# Patient Record
Sex: FEMALE | Race: WHITE | NOT HISPANIC OR LATINO | Employment: FULL TIME
[De-identification: names, ages, dates, MRNs, and addresses within clinical notes are randomized per-mention and may not be internally consistent; named-entity substitution may affect disease eponyms.]

---

## 2017-06-03 ENCOUNTER — HEALTH MAINTENANCE LETTER (OUTPATIENT)
Age: 52
End: 2017-06-03

## 2022-04-25 ENCOUNTER — OFFICE VISIT (OUTPATIENT)
Dept: FAMILY MEDICINE | Facility: CLINIC | Age: 57
End: 2022-04-25
Payer: COMMERCIAL

## 2022-04-25 VITALS
SYSTOLIC BLOOD PRESSURE: 118 MMHG | WEIGHT: 191 LBS | OXYGEN SATURATION: 100 % | RESPIRATION RATE: 16 BRPM | BODY MASS INDEX: 29.98 KG/M2 | HEART RATE: 83 BPM | HEIGHT: 67 IN | DIASTOLIC BLOOD PRESSURE: 78 MMHG | TEMPERATURE: 98 F

## 2022-04-25 DIAGNOSIS — R11.2 NAUSEA AND VOMITING, INTRACTABILITY OF VOMITING NOT SPECIFIED, UNSPECIFIED VOMITING TYPE: ICD-10-CM

## 2022-04-25 DIAGNOSIS — Z12.11 SCREEN FOR COLON CANCER: ICD-10-CM

## 2022-04-25 DIAGNOSIS — N20.2 CALCULUS OF KIDNEY WITH CALCULUS OF URETER: ICD-10-CM

## 2022-04-25 DIAGNOSIS — Z13.220 SCREENING FOR HYPERLIPIDEMIA: ICD-10-CM

## 2022-04-25 DIAGNOSIS — R10.13 ABDOMINAL PAIN, EPIGASTRIC: Primary | ICD-10-CM

## 2022-04-25 DIAGNOSIS — N13.1 HYDRONEPHROSIS WITH URETERAL STRICTURE, NOT ELSEWHERE CLASSIFIED: ICD-10-CM

## 2022-04-25 DIAGNOSIS — N20.1 URETERAL STONE: ICD-10-CM

## 2022-04-25 LAB
ERYTHROCYTE [DISTWIDTH] IN BLOOD BY AUTOMATED COUNT: 13.6 % (ref 10–15)
HCT VFR BLD AUTO: 45.5 % (ref 35–47)
HGB BLD-MCNC: 15 G/DL (ref 11.7–15.7)
MCH RBC QN AUTO: 29.5 PG (ref 26.5–33)
MCHC RBC AUTO-ENTMCNC: 33 G/DL (ref 31.5–36.5)
MCV RBC AUTO: 89 FL (ref 78–100)
PLATELET # BLD AUTO: 216 10E3/UL (ref 150–450)
RBC # BLD AUTO: 5.09 10E6/UL (ref 3.8–5.2)
WBC # BLD AUTO: 7.4 10E3/UL (ref 4–11)

## 2022-04-25 PROCEDURE — 83690 ASSAY OF LIPASE: CPT | Performed by: FAMILY MEDICINE

## 2022-04-25 PROCEDURE — 80053 COMPREHEN METABOLIC PANEL: CPT | Performed by: FAMILY MEDICINE

## 2022-04-25 PROCEDURE — 36415 COLL VENOUS BLD VENIPUNCTURE: CPT | Performed by: FAMILY MEDICINE

## 2022-04-25 PROCEDURE — 85027 COMPLETE CBC AUTOMATED: CPT | Performed by: FAMILY MEDICINE

## 2022-04-25 PROCEDURE — 99204 OFFICE O/P NEW MOD 45 MIN: CPT | Performed by: FAMILY MEDICINE

## 2022-04-25 RX ORDER — ONDANSETRON 4 MG/1
4 TABLET, FILM COATED ORAL EVERY 8 HOURS PRN
Qty: 15 TABLET | Refills: 0 | Status: SHIPPED | OUTPATIENT
Start: 2022-04-25 | End: 2022-09-23

## 2022-04-25 RX ORDER — MULTIVITAMIN
1 TABLET ORAL DAILY
COMMUNITY
End: 2022-04-25

## 2022-04-25 ASSESSMENT — PAIN SCALES - GENERAL: PAINLEVEL: MILD PAIN (2)

## 2022-04-25 NOTE — PROGRESS NOTES
"  Assessment & Plan     Screen for colon cancer      Screening for hyperlipidemia      Abdominal pain, epigastric  Has been for last 3-4 days after taking undercooked egg white, has no fever  Started with diarrhea, then started having N/V, not able to keep the food down. Will have her to try zofran   Will review the lab and abd XR for further evaluation   - CBC with platelets; Future  - Comprehensive metabolic panel (BMP + Alb, Alk Phos, ALT, AST, Total. Bili, TP); Future  - Lipase; Future  - XR Abdomen 2 Views; Future  - Enteric Bacteria and Virus Panel by WILDER Stool; Future  - Ova and Parasite Exam Routine; Future  - Cryptosporidium/Giardia Immunoassay; Future  - Clostridium difficile Toxin B PCR; Future  - CBC with platelets  - Comprehensive metabolic panel (BMP + Alb, Alk Phos, ALT, AST, Total. Bili, TP)  - Lipase  - Enteric Bacteria and Virus Panel by WILDER Stool  - Ova and Parasite Exam Routine  - Cryptosporidium/Giardia Immunoassay  - Clostridium difficile Toxin B PCR         BMI:   Estimated body mass index is 29.91 kg/m  as calculated from the following:    Height as of this encounter: 1.702 m (5' 7\").    Weight as of this encounter: 86.6 kg (191 lb).   Weight management plan: Discussed healthy diet and exercise guidelines    FUTURE APPOINTMENTS:       - Follow-up visit in 1 week if not improving     No follow-ups on file.    Christopher Hernandez MD  [facility] PRAIRIELIEL Cordero is a 57 year old who presents for the following health issues     History of Present Illness       Reason for visit:  Left abdominal pain, vomiting  Symptom onset:  3-7 days ago  Symptoms include:  Severe pain in left abdominal area, causing throwup. When aubsided it feels like a constant cramp  Symptom progression:  Staying the same  Had these symptoms before:  No  What makes it worse:  No  What makes it better:  Vomiting    She eats 2-3 servings of fruits and vegetables daily.She consumes 0 sweetened " "beverage(s) daily.She exercises with enough effort to increase her heart rate 9 or less minutes per day.  She exercises with enough effort to increase her heart rate 3 or less days per week.   She is taking medications regularly.       Review of Systems   Constitutional, HEENT, cardiovascular, pulmonary, gi and gu systems are negative, except as otherwise noted.      Objective    /78   Pulse 83   Temp 98  F (36.7  C) (Temporal)   Resp 16   Ht 1.702 m (5' 7\")   Wt 86.6 kg (191 lb)   SpO2 100%   BMI 29.91 kg/m    Body mass index is 29.91 kg/m .  Physical Exam   GENERAL: healthy, alert and no distress  NECK: no adenopathy, no asymmetry, masses, or scars and thyroid normal to palpation  RESP: lungs clear to auscultation - no rales, rhonchi or wheezes  CV: regular rate and rhythm, normal S1 S2, no S3 or S4, no murmur, click or rub, no peripheral edema and peripheral pulses strong  ABDOMEN: soft, nontender, no hepatosplenomegaly, no masses and bowel sounds normal  MS: no gross musculoskeletal defects noted, no edema                "

## 2022-04-26 ENCOUNTER — HOSPITAL ENCOUNTER (OUTPATIENT)
Dept: CT IMAGING | Facility: CLINIC | Age: 57
Discharge: HOME | End: 2022-04-26
Attending: FAMILY MEDICINE | Admitting: FAMILY MEDICINE
Payer: COMMERCIAL

## 2022-04-26 DIAGNOSIS — N20.2 CALCULUS OF KIDNEY WITH CALCULUS OF URETER: ICD-10-CM

## 2022-04-26 LAB
ALBUMIN SERPL-MCNC: 4 G/DL (ref 3.4–5)
ALP SERPL-CCNC: 120 U/L (ref 40–150)
ALT SERPL W P-5'-P-CCNC: 28 U/L (ref 0–50)
ANION GAP SERPL CALCULATED.3IONS-SCNC: 6 MMOL/L (ref 3–14)
AST SERPL W P-5'-P-CCNC: 20 U/L (ref 0–45)
BILIRUB SERPL-MCNC: 0.6 MG/DL (ref 0.2–1.3)
BUN SERPL-MCNC: 15 MG/DL (ref 7–30)
CALCIUM SERPL-MCNC: 9.8 MG/DL (ref 8.5–10.1)
CHLORIDE BLD-SCNC: 107 MMOL/L (ref 94–109)
CO2 SERPL-SCNC: 27 MMOL/L (ref 20–32)
CREAT SERPL-MCNC: 1.16 MG/DL (ref 0.52–1.04)
GFR SERPL CREATININE-BSD FRML MDRD: 54 ML/MIN/1.73M2
GLUCOSE BLD-MCNC: 95 MG/DL (ref 70–99)
LIPASE SERPL-CCNC: 122 U/L (ref 73–393)
POTASSIUM BLD-SCNC: 4.1 MMOL/L (ref 3.4–5.3)
PROT SERPL-MCNC: 7.9 G/DL (ref 6.8–8.8)
SODIUM SERPL-SCNC: 140 MMOL/L (ref 133–144)

## 2022-04-26 PROCEDURE — 74176 CT ABD & PELVIS W/O CONTRAST: CPT

## 2022-04-27 ENCOUNTER — PRE VISIT (OUTPATIENT)
Dept: UROLOGY | Facility: CLINIC | Age: 57
End: 2022-04-27
Payer: COMMERCIAL

## 2022-04-27 RX ORDER — TAMSULOSIN HYDROCHLORIDE 0.4 MG/1
0.4 CAPSULE ORAL DAILY
Qty: 20 CAPSULE | Refills: 0 | Status: SHIPPED | OUTPATIENT
Start: 2022-04-27 | End: 2022-09-23

## 2022-04-27 NOTE — TELEPHONE ENCOUNTER
MEDICAL RECORDS REQUEST   [facility]  Urology Clinic  [address] [place] [address]  PHONE: [phone]  Fax: [phone]        FUTURE VISIT INFORMATION                                                   Consuelo Trejo, : 1965 scheduled for future visit at [facility]    APPOINTMENT INFORMATION:    Date: 2022    Provider:  Meliton Camarillo MD    Reason for Visit/Diagnosis: kidney stones    REFERRAL INFORMATION:    Referring provider:  Christopher Hernandez MD    Referring providers clinic:  [facility]    RECORDS REQUESTED FOR VISIT                                                     NOTES  STATUS/DETAILS   OFFICE NOTE from referring provider  yes, 2022 -- Christopher Hernandez MD in [facility]   MEDICATION LIST  yes   KIDNEY STONE     CT ABD PELVIS  yes, 2022   IMAGING (IMAGES & REPORT)  yes, 2022 -- XR ABD     PRE-VISIT CHECKLIST      Record collection complete Yes   Appointment appropriately scheduled           (right time/right provider) Yes   Joint diagnostic appointment coordinated correctly          (ensure right order & amount of time) Yes   MyChart activation Yes   Questionnaire complete If no, please explain pending

## 2022-04-29 ENCOUNTER — VIRTUAL VISIT (OUTPATIENT)
Dept: UROLOGY | Facility: CLINIC | Age: 57
End: 2022-04-29
Attending: FAMILY MEDICINE
Payer: COMMERCIAL

## 2022-04-29 DIAGNOSIS — N13.1 HYDRONEPHROSIS WITH URETERAL STRICTURE, NOT ELSEWHERE CLASSIFIED: ICD-10-CM

## 2022-04-29 DIAGNOSIS — N20.2 CALCULUS OF KIDNEY WITH CALCULUS OF URETER: ICD-10-CM

## 2022-04-29 DIAGNOSIS — N20.1 URETERAL STONE: ICD-10-CM

## 2022-04-29 PROCEDURE — 99204 OFFICE O/P NEW MOD 45 MIN: CPT | Mod: GT | Performed by: UROLOGY

## 2022-04-29 NOTE — PROGRESS NOTES
Consuelo is a 57 year old who is being evaluated via a billable video visit.      How would you like to obtain your AVS? Mail a copy  If the video visit is dropped, the invitation should be resent by: Text to cell phone: [phone]  Will anyone else be joining your video visit? Yes: melissa (wife). How would they like to receive their invitation? Text to cell phone: [phone]      Video Start Time: 3:12 PM  Video-Visit Details    Type of service:  Video Visit    Video End Time:3:37 PM    Originating Location (pt. Location): Home    Distant Location (provider location):  [facility] UROLOGY [facility]     Platform used for Video Visit: [employer]     Name: Consuelo Trejo   MRN: [id number]  YOB: 1965    Assessment and Plan:  57 year old female with left ureteral and multiple left renal stones.     1. Calculus of kidney with calculus of ureter  2. Ureteral stone  3. Hydronephrosis with ureteral stricture, not elsewhere classified    We discussed observation, shock wave lithotripsy, ureteroscopy and percutaneous nephrolithotomy as the main surgical approaches to upper urinary tract stones.  We reviewed risks and benefits including but not limited to the following: bleeding, infection, damage to adjacent organs, ureteral stricture, need for staged treatment, incomplete stone removal.    Ultimately the patient has elected to proceed with left ureteroscopy because of ability to manage ureteral and renal stone in one setting with removal    Plan:  -90 min left URS at Leslie Camarillo MD  April 29, 2022         Chief Complaint: left ureteral and renal sotnes    History of Present Illness:  Consuelo Trejo is a 57 year old female seen in consultation from Dr Hernandez for discussion of nephrolithiasis.  Accompanied by her wife.     The current episode was first found due to abdominal pain started last week.  CT abd/pel without contrastperformed on 4/26/2022 (images personally reviewed)  demonstrated:  Right Kidney: no right stones  Left Kidney: 6 mm left proximal ureteral stone, 7 mm renal stone, two <3 mm renal stones  Additional relevant findings: left adrenal adenoma    Currently, they are experiencing severe pain from Thursday through Monday with nausea.   minimal flank pain, minimal nausea, no vomiting, no hematuria, or no dysuria.  They have not experienced recent stone passage.     Pertinent stone history:  -- Personal stone history  -- Prior stone procedure  -- Prior stone analysis  -- Prior metabolic evaluation  + Family stone history (dad and brother)    Pertinent stone medical history  -- Diabetes  -- Neurologic disease limiting mobility   -- Osteoporosis  -- Gout  -- Gastric bypass surgery  -- Sarcoidosis  -- Inflammatory bowel disease  -- History of non-stone  surgery     Pertinent medications:  -- Antiplatelet  -- Anticoagulant  -- Topiramate   -- Thiazaide  -- Potassium supplement      I reviewed internal labs, of which pertinent ones include:   Hemoglobin   Date Value Ref Range Status   2022 15.0 11.7 - 15.7 g/dL Final   05/10/2012 13.2 12.0 - 18.0 g/dL      Potassium   Date Value Ref Range Status   2022 4.1 3.4 - 5.3 mmol/L Final   2012 4.3 3.5 - 5.3 mmol/L Final     Creatinine   Date Value Ref Range Status   2022 1.16 (H) 0.52 - 1.04 mg/dL Final   2012 0.90 0.50 - 1.10 mg/dL Final     pH Urine   Date Value Ref Range Status   2012 5.0 5.0 - 7.0 pH        I reviewed internal records which in summarized above.         Past Medical History:  No past medical history on file.         Past Surgical History:  Past Surgical History:   Procedure Laterality Date     CARPAL TUNNEL RELEASE RT/LT      right      SECTION       COLONOSCOPY  22    Normal     uterine ablation              Social History:  Social History     Tobacco Use     Smoking status: Former Smoker     Packs/day: 1.00     Years: 20.00     Pack years: 20.00     Types: Cigarettes  "    Quit date: 8/3/2002     Years since quittin.7     Smokeless tobacco: Never Used   Substance Use Topics     Alcohol use: No     Drug use: Not Currently     Types: Marijuana     Comment: Tried in HS            Family History:  Family History   Problem Relation Age of Onset     Cardiovascular Mother         mi 79, dm,  at 79     Diabetes Mother      Family History Negative Father         a and well     Anesthesia Reaction Father      Asthma Father      Anesthesia Reaction Sister             Allergies:  Allergies   Allergen Reactions     Advil [Ibuprofen] Other (See Comments)     \"Lump in throat\"            Medications:  Current Outpatient Medications   Medication Sig     ondansetron (ZOFRAN) 4 MG tablet Take 1 tablet (4 mg) by mouth every 8 hours as needed for nausea     tamsulosin (FLOMAX) 0.4 MG capsule Take 1 capsule (0.4 mg) by mouth daily     No current facility-administered medications for this visit.       Review of Systems:   ROS: 10 point ROS neg other than the symptoms noted above in the HPI.    Physical Exam:  B/P: Data Unavailable, T: Data Unavailable, P: Data Unavailable, R: Data Unavailable  Estimated body mass index is 29.91 kg/m  as calculated from the following:    Height as of 22: 1.702 m (5' 7\").    Weight as of 22: 86.6 kg (191 lb).  General Appearance Adult: Alert, no acute distress, oriented  Lungs: no respiratory distress, or pursed lip breathing  Neuro: Alert, oriented, speech and mentation normal  Psych: affect and mood normal    Outside records:   I spent 0 minutes reviewing outside records.    "

## 2022-04-29 NOTE — LETTER
4/29/2022       RE: Consuelo Trejo  [address] [address]     Dear Colleague,    Thank you for referring your patient, Consuelo Trejo, to the [facility] [place] at [facility]. Please see a copy of my visit note below.    Consuelo is a 57 year old who is being evaluated via a billable video visit.      How would you like to obtain your AVS? Mail a copy  If the video visit is dropped, the invitation should be resent by: Text to cell phone: [phone]  Will anyone else be joining your video visit? Yes: melissa (wife). How would they like to receive their invitation? Text to cell phone: [phone]      Video Start Time: 3:12 PM  Video-Visit Details    Type of service:  Video Visit    Video End Time:3:37 PM    Originating Location (pt. Location): Home    Distant Location (provider location):  [facility] UROLOG[facility]     Platform used for Video Visit: [employer]     Name: Consuelo Trejo   MRN: [id number]  YOB: 1965    Assessment and Plan:  57 year old female with left ureteral and multiple left renal stones.     1. Calculus of kidney with calculus of ureter  2. Ureteral stone  3. Hydronephrosis with ureteral stricture, not elsewhere classified    We discussed observation, shock wave lithotripsy, ureteroscopy and percutaneous nephrolithotomy as the main surgical approaches to upper urinary tract stones.  We reviewed risks and benefits including but not limited to the following: bleeding, infection, damage to adjacent organs, ureteral stricture, need for staged treatment, incomplete stone removal.    Ultimately the patient has elected to proceed with left ureteroscopy because of ability to manage ureteral and renal stone in one setting with removal    Plan:  -90 min left URS at Leslie Camarillo MD  April 29, 2022         Chief Complaint: left ureteral and renal  clarence    History of Present Illness:  Consuelo Trejo is a 57 year old female seen in consultation from Dr Hernandez for discussion of nephrolithiasis.  Accompanied by her wife.     The current episode was first found due to abdominal pain started last week.  CT abd/pel without contrastperformed on 4/26/2022 (images personally reviewed) demonstrated:  Right Kidney: no right stones  Left Kidney: 6 mm left proximal ureteral stone, 7 mm renal stone, two <3 mm renal stones  Additional relevant findings: left adrenal adenoma    Currently, they are experiencing severe pain from Thursday through Monday with nausea.   minimal flank pain, minimal nausea, no vomiting, no hematuria, or no dysuria.  They have not experienced recent stone passage.     Pertinent stone history:  -- Personal stone history  -- Prior stone procedure  -- Prior stone analysis  -- Prior metabolic evaluation  + Family stone history (dad and brother)    Pertinent stone medical history  -- Diabetes  -- Neurologic disease limiting mobility   -- Osteoporosis  -- Gout  -- Gastric bypass surgery  -- Sarcoidosis  -- Inflammatory bowel disease  -- History of non-stone  surgery     Pertinent medications:  -- Antiplatelet  -- Anticoagulant  -- Topiramate   -- Thiazaide  -- Potassium supplement      I reviewed internal labs, of which pertinent ones include:   Hemoglobin   Date Value Ref Range Status   04/25/2022 15.0 11.7 - 15.7 g/dL Final   05/10/2012 13.2 12.0 - 18.0 g/dL      Potassium   Date Value Ref Range Status   04/25/2022 4.1 3.4 - 5.3 mmol/L Final   04/18/2012 4.3 3.5 - 5.3 mmol/L Final     Creatinine   Date Value Ref Range Status   04/25/2022 1.16 (H) 0.52 - 1.04 mg/dL Final   04/18/2012 0.90 0.50 - 1.10 mg/dL Final     pH Urine   Date Value Ref Range Status   04/18/2012 5.0 5.0 - 7.0 pH        I reviewed internal records which in summarized above.         Past Medical History:  No past medical history on file.         Past Surgical History:  Past  "Surgical History:   Procedure Laterality Date     CARPAL TUNNEL RELEASE RT/LT      right      SECTION       COLONOSCOPY  22    Normal     uterine ablation              Social History:  Social History     Tobacco Use     Smoking status: Former Smoker     Packs/day: 1.00     Years: 20.00     Pack years: 20.00     Types: Cigarettes     Quit date: 8/3/2002     Years since quittin.7     Smokeless tobacco: Never Used   Substance Use Topics     Alcohol use: No     Drug use: Not Currently     Types: Marijuana     Comment: Tried in HS            Family History:  Family History   Problem Relation Age of Onset     Cardiovascular Mother         mi 79, dm,  at 79     Diabetes Mother      Family History Negative Father         a and well     Anesthesia Reaction Father      Asthma Father      Anesthesia Reaction Sister             Allergies:  Allergies   Allergen Reactions     Advil [Ibuprofen] Other (See Comments)     \"Lump in throat\"            Medications:  Current Outpatient Medications   Medication Sig     ondansetron (ZOFRAN) 4 MG tablet Take 1 tablet (4 mg) by mouth every 8 hours as needed for nausea     tamsulosin (FLOMAX) 0.4 MG capsule Take 1 capsule (0.4 mg) by mouth daily     No current facility-administered medications for this visit.       Review of Systems:   ROS: 10 point ROS neg other than the symptoms noted above in the HPI.    Physical Exam:  B/P: Data Unavailable, T: Data Unavailable, P: Data Unavailable, R: Data Unavailable  Estimated body mass index is 29.91 kg/m  as calculated from the following:    Height as of 22: 1.702 m (5' 7\").    Weight as of 22: 86.6 kg (191 lb).  General Appearance Adult: Alert, no acute distress, oriented  Lungs: no respiratory distress, or pursed lip breathing  Neuro: Alert, oriented, speech and mentation normal  Psych: affect and mood normal    Outside records:   I spent 0 minutes reviewing outside records.      "

## 2022-05-02 DIAGNOSIS — Z11.59 ENCOUNTER FOR SCREENING FOR OTHER VIRAL DISEASES: Primary | ICD-10-CM

## 2022-05-04 ENCOUNTER — LAB (OUTPATIENT)
Dept: LAB | Facility: CLINIC | Age: 57
End: 2022-05-04
Payer: COMMERCIAL

## 2022-05-04 ENCOUNTER — TELEPHONE (OUTPATIENT)
Dept: FAMILY MEDICINE | Facility: CLINIC | Age: 57
End: 2022-05-04
Payer: COMMERCIAL

## 2022-05-04 DIAGNOSIS — N20.1 URETERAL STONE: Primary | ICD-10-CM

## 2022-05-04 PROCEDURE — 87088 URINE BACTERIA CULTURE: CPT

## 2022-05-04 PROCEDURE — 87086 URINE CULTURE/COLONY COUNT: CPT

## 2022-05-04 NOTE — H&P (VIEW-ONLY)
[address] [address]  Phone: [phone]  Primary Provider: No Ref-Primary, Physician        PREOPERATIVE EVALUATION:  Today's date: 5/5/2022    Consuelo Trejo is a 57 year old female who presents for a preoperative evaluation.    Surgical Information:  Surgery/Procedure: Cystoscopy, left retrograde pyelogram, left ureteroscopy with laser lithotripsy and stone basket extraction, left stent placement  Surgery Location: [facility]  Surgeon: Jennifer  Surgery Date: 05/17  Time of Surgery: 140  Where patient plans to recover: At home with family  Fax number for surgical facility: Note does not need to be faxed, will be available electronically in Epic.    Type of Anesthesia Anticipated: General    Assessment & Plan     The proposed surgical procedure is considered LOW risk.    Preop general physical exam    - EKG 12-lead complete w/read - Clinics  - Basic metabolic panel  (Ca, Cl, CO2, Creat, Gluc, K, Na, BUN); Future    Kidney stone    - Basic metabolic panel  (Ca, Cl, CO2, Creat, Gluc, K, Na, BUN); Future    Risks and Recommendations:  The patient has the following additional risks and recommendations for perioperative complications:   - No identified additional risk factors other than previously addressed    Medication Instructions:   - diclofenac (Voltaren): HOLD 1 day before surgery.     RECOMMENDATION:  APPROVAL GIVEN to proceed with proposed procedure, without further diagnostic evaluation.    Review of external notes as documented above           Subjective       Preop Questions 5/3/2022   1. Have you ever had a heart attack or stroke? No   2. Have you ever had surgery on your heart or blood vessels, such as a stent placement, a coronary artery bypass, or surgery on an artery in your head, neck, heart, or legs? No   3. Do you have chest pain with activity? No   4. Do you have a history of  heart failure? No   5.  Do you currently have a cold, bronchitis or symptoms of other infection? No   6. Do you have a cough, shortness of breath, or wheezing? No   7. Do you or anyone in your family have previous history of blood clots? No   8. Do you or does anyone in your family have a serious bleeding problem such as prolonged bleeding following surgeries or cuts? No   9. Have you ever had problems with anemia or been told to take iron pills? No   10. Have you had any abnormal blood loss such as black, tarry or bloody stools, or abnormal vaginal bleeding? No   11. Have you ever had a blood transfusion? No   12. Are you willing to have a blood transfusion if it is medically needed before, during, or after your surgery? Yes   13. Have you or any of your relatives ever had problems with anesthesia? YES   14. Do you have sleep apnea, excessive snoring or daytime drowsiness? YES   14a. Do you have a CPAP machine? No   15. Do you have any artifical heart valves or other implanted medical devices like a pacemaker, defibrillator, or continuous glucose monitor? No   16. Do you have artificial joints? No   17. Are you allergic to latex? No   18. Is there any chance that you may be pregnant? No       Health Care Directive:  Patient does not have a Health Care Directive or Living Will: Discussed advance care planning with patient; however, patient declined at this time.    Preoperative Review of :   reviewed - no record of controlled substances prescribed.    Review of Systems  CONSTITUTIONAL: NEGATIVE for fever, chills, change in weight  ENT/MOUTH: NEGATIVE for ear, mouth and throat problems  RESP: NEGATIVE for significant cough or SOB  CV: NEGATIVE for chest pain, palpitations or peripheral edema    Patient Active Problem List    Diagnosis Date Noted     Urticaria 05/09/2012     Priority: Medium     Weight gain 04/17/2012     Priority: Medium     Ankle pain 04/17/2012     Priority: Medium     Ankle pain 04/17/2012     Priority: Medium     "  No past medical history on file.  Past Surgical History:   Procedure Laterality Date     CARPAL TUNNEL RELEASE RT/LT      right      SECTION       COLONOSCOPY  22    Normal     uterine ablation       Current Outpatient Medications   Medication Sig Dispense Refill     diclofenac (VOLTAREN) 50 MG EC tablet Take 1 tablet (50 mg) by mouth 3 times daily as needed for moderate pain 42 tablet 0     ondansetron (ZOFRAN) 4 MG tablet Take 1 tablet (4 mg) by mouth every 8 hours as needed for nausea 15 tablet 0     tamsulosin (FLOMAX) 0.4 MG capsule Take 1 capsule (0.4 mg) by mouth daily 20 capsule 0       Allergies   Allergen Reactions     Advil [Ibuprofen] Other (See Comments)     \"Lump in throat\"        Social History     Tobacco Use     Smoking status: Former Smoker     Packs/day: 1.00     Years: 20.00     Pack years: 20.00     Types: Cigarettes     Quit date: 8/3/2002     Years since quittin.7     Smokeless tobacco: Never Used   Substance Use Topics     Alcohol use: No     Family History   Problem Relation Age of Onset     Cardiovascular Mother         mi 79, dm,  at 79     Diabetes Mother      Family History Negative Father         a and well     Anesthesia Reaction Father      Asthma Father      Anesthesia Reaction Sister      History   Drug Use Unknown     Comment: Tried in HS         Objective     /72   Pulse 82   Temp 98.7  F (37.1  C) (Tympanic)   Wt 86.6 kg (191 lb)   BMI 29.91 kg/m      Physical Exam  GENERAL APPEARANCE: healthy, alert and no distress  HENT: ear canals and TM's normal and nose and mouth without ulcers or lesions  RESP: lungs clear to auscultation - no rales, rhonchi or wheezes  CV: regular rate and rhythm, normal S1 S2, no S3 or S4 and no murmur, click or rub   ABDOMEN: soft, nontender, no HSM or masses and bowel sounds normal  NEURO: Normal strength and tone, sensory exam grossly normal, mentation intact and speech normal    Recent Labs   Lab Test 22  2655 "   HGB 15.0         POTASSIUM 4.1   CR 1.16*        Diagnostics:  Recent Results (from the past 168 hour(s))   Urine Culture    Collection Time: 05/04/22  9:34 AM    Specimen: Urine, Midstream   Result Value Ref Range    Culture >100,000 CFU/mL Gardnerella vaginalis (A)    Basic metabolic panel  (Ca, Cl, CO2, Creat, Gluc, K, Na, BUN)    Collection Time: 05/05/22  2:57 PM   Result Value Ref Range    Sodium 138 133 - 144 mmol/L    Potassium 4.3 3.4 - 5.3 mmol/L    Chloride 105 94 - 109 mmol/L    Carbon Dioxide (CO2) 27 20 - 32 mmol/L    Anion Gap 6 3 - 14 mmol/L    Urea Nitrogen 14 7 - 30 mg/dL    Creatinine 0.63 0.52 - 1.04 mg/dL    Calcium 9.2 8.5 - 10.1 mg/dL    Glucose 96 70 - 99 mg/dL    GFR Estimate >90 >60 mL/min/1.73m2      EKG: appears normal, NSR, normal axis, normal intervals, no acute ST/T changes c/w ischemia, no LVH by voltage criteria    Revised Cardiac Risk Index (RCRI):  The patient has the following serious cardiovascular risks for perioperative complications:   - No serious cardiac risks = 0 points     RCRI Interpretation: 0 points: Class I (very low risk - 0.4% complication rate)           Signed Electronically by: Christopher Hernandez MD  Copy of this evaluation report is provided to requesting physician.

## 2022-05-04 NOTE — TELEPHONE ENCOUNTER
Received a call from the patient stating her Abdominal CT completed 4/26/22 is not being covered under insurance. Insurance states they need a copy of the patient's results.     Referred patient to billing department. Billing department going to send patient's results to insurance company.    Carmel Strong RN

## 2022-05-04 NOTE — PROGRESS NOTES
[address] [address]  Phone: [phone]  Primary Provider: No Ref-Primary, Physician        PREOPERATIVE EVALUATION:  Today's date: 5/5/2022    Consuelo Trejo is a 57 year old female who presents for a preoperative evaluation.    Surgical Information:  Surgery/Procedure: Cystoscopy, left retrograde pyelogram, left ureteroscopy with laser lithotripsy and stone basket extraction, left stent placement  Surgery Location: [facility]  Surgeon: Jennifer  Surgery Date: 05/17  Time of Surgery: 140  Where patient plans to recover: At home with family  Fax number for surgical facility: Note does not need to be faxed, will be available electronically in Epic.    Type of Anesthesia Anticipated: General    Assessment & Plan     The proposed surgical procedure is considered LOW risk.    Preop general physical exam    - EKG 12-lead complete w/read - Clinics  - Basic metabolic panel  (Ca, Cl, CO2, Creat, Gluc, K, Na, BUN); Future    Kidney stone    - Basic metabolic panel  (Ca, Cl, CO2, Creat, Gluc, K, Na, BUN); Future    Risks and Recommendations:  The patient has the following additional risks and recommendations for perioperative complications:   - No identified additional risk factors other than previously addressed    Medication Instructions:   - diclofenac (Voltaren): HOLD 1 day before surgery.     RECOMMENDATION:  APPROVAL GIVEN to proceed with proposed procedure, without further diagnostic evaluation.    Review of external notes as documented above           Subjective       Preop Questions 5/3/2022   1. Have you ever had a heart attack or stroke? No   2. Have you ever had surgery on your heart or blood vessels, such as a stent placement, a coronary artery bypass, or surgery on an artery in your head, neck, heart, or legs? No   3. Do you have chest pain with activity? No   4. Do you have a history of  heart failure? No   5.  Do you currently have a cold, bronchitis or symptoms of other infection? No   6. Do you have a cough, shortness of breath, or wheezing? No   7. Do you or anyone in your family have previous history of blood clots? No   8. Do you or does anyone in your family have a serious bleeding problem such as prolonged bleeding following surgeries or cuts? No   9. Have you ever had problems with anemia or been told to take iron pills? No   10. Have you had any abnormal blood loss such as black, tarry or bloody stools, or abnormal vaginal bleeding? No   11. Have you ever had a blood transfusion? No   12. Are you willing to have a blood transfusion if it is medically needed before, during, or after your surgery? Yes   13. Have you or any of your relatives ever had problems with anesthesia? YES   14. Do you have sleep apnea, excessive snoring or daytime drowsiness? YES   14a. Do you have a CPAP machine? No   15. Do you have any artifical heart valves or other implanted medical devices like a pacemaker, defibrillator, or continuous glucose monitor? No   16. Do you have artificial joints? No   17. Are you allergic to latex? No   18. Is there any chance that you may be pregnant? No       Health Care Directive:  Patient does not have a Health Care Directive or Living Will: Discussed advance care planning with patient; however, patient declined at this time.    Preoperative Review of :   reviewed - no record of controlled substances prescribed.    Review of Systems  CONSTITUTIONAL: NEGATIVE for fever, chills, change in weight  ENT/MOUTH: NEGATIVE for ear, mouth and throat problems  RESP: NEGATIVE for significant cough or SOB  CV: NEGATIVE for chest pain, palpitations or peripheral edema    Patient Active Problem List    Diagnosis Date Noted     Urticaria 05/09/2012     Priority: Medium     Weight gain 04/17/2012     Priority: Medium     Ankle pain 04/17/2012     Priority: Medium     Ankle pain 04/17/2012     Priority: Medium     "  No past medical history on file.  Past Surgical History:   Procedure Laterality Date     CARPAL TUNNEL RELEASE RT/LT      right      SECTION       COLONOSCOPY  22    Normal     uterine ablation       Current Outpatient Medications   Medication Sig Dispense Refill     diclofenac (VOLTAREN) 50 MG EC tablet Take 1 tablet (50 mg) by mouth 3 times daily as needed for moderate pain 42 tablet 0     ondansetron (ZOFRAN) 4 MG tablet Take 1 tablet (4 mg) by mouth every 8 hours as needed for nausea 15 tablet 0     tamsulosin (FLOMAX) 0.4 MG capsule Take 1 capsule (0.4 mg) by mouth daily 20 capsule 0       Allergies   Allergen Reactions     Advil [Ibuprofen] Other (See Comments)     \"Lump in throat\"        Social History     Tobacco Use     Smoking status: Former Smoker     Packs/day: 1.00     Years: 20.00     Pack years: 20.00     Types: Cigarettes     Quit date: 8/3/2002     Years since quittin.7     Smokeless tobacco: Never Used   Substance Use Topics     Alcohol use: No     Family History   Problem Relation Age of Onset     Cardiovascular Mother         mi 79, dm,  at 79     Diabetes Mother      Family History Negative Father         a and well     Anesthesia Reaction Father      Asthma Father      Anesthesia Reaction Sister      History   Drug Use Unknown     Comment: Tried in HS         Objective     /72   Pulse 82   Temp 98.7  F (37.1  C) (Tympanic)   Wt 86.6 kg (191 lb)   BMI 29.91 kg/m      Physical Exam  GENERAL APPEARANCE: healthy, alert and no distress  HENT: ear canals and TM's normal and nose and mouth without ulcers or lesions  RESP: lungs clear to auscultation - no rales, rhonchi or wheezes  CV: regular rate and rhythm, normal S1 S2, no S3 or S4 and no murmur, click or rub   ABDOMEN: soft, nontender, no HSM or masses and bowel sounds normal  NEURO: Normal strength and tone, sensory exam grossly normal, mentation intact and speech normal    Recent Labs   Lab Test 22  8875 "   HGB 15.0         POTASSIUM 4.1   CR 1.16*        Diagnostics:  Recent Results (from the past 168 hour(s))   Urine Culture    Collection Time: 05/04/22  9:34 AM    Specimen: Urine, Midstream   Result Value Ref Range    Culture >100,000 CFU/mL Gardnerella vaginalis (A)    Basic metabolic panel  (Ca, Cl, CO2, Creat, Gluc, K, Na, BUN)    Collection Time: 05/05/22  2:57 PM   Result Value Ref Range    Sodium 138 133 - 144 mmol/L    Potassium 4.3 3.4 - 5.3 mmol/L    Chloride 105 94 - 109 mmol/L    Carbon Dioxide (CO2) 27 20 - 32 mmol/L    Anion Gap 6 3 - 14 mmol/L    Urea Nitrogen 14 7 - 30 mg/dL    Creatinine 0.63 0.52 - 1.04 mg/dL    Calcium 9.2 8.5 - 10.1 mg/dL    Glucose 96 70 - 99 mg/dL    GFR Estimate >90 >60 mL/min/1.73m2      EKG: appears normal, NSR, normal axis, normal intervals, no acute ST/T changes c/w ischemia, no LVH by voltage criteria    Revised Cardiac Risk Index (RCRI):  The patient has the following serious cardiovascular risks for perioperative complications:   - No serious cardiac risks = 0 points     RCRI Interpretation: 0 points: Class I (very low risk - 0.4% complication rate)           Signed Electronically by: Christopher Hernandez MD  Copy of this evaluation report is provided to requesting physician.

## 2022-05-05 ENCOUNTER — OFFICE VISIT (OUTPATIENT)
Dept: FAMILY MEDICINE | Facility: CLINIC | Age: 57
End: 2022-05-05
Payer: COMMERCIAL

## 2022-05-05 VITALS
DIASTOLIC BLOOD PRESSURE: 72 MMHG | SYSTOLIC BLOOD PRESSURE: 112 MMHG | HEART RATE: 82 BPM | WEIGHT: 191 LBS | TEMPERATURE: 98.7 F | BODY MASS INDEX: 29.91 KG/M2

## 2022-05-05 DIAGNOSIS — N20.0 KIDNEY STONE: ICD-10-CM

## 2022-05-05 DIAGNOSIS — Z01.818 PREOP GENERAL PHYSICAL EXAM: Primary | ICD-10-CM

## 2022-05-05 PROCEDURE — 80048 BASIC METABOLIC PNL TOTAL CA: CPT | Performed by: FAMILY MEDICINE

## 2022-05-05 PROCEDURE — 36415 COLL VENOUS BLD VENIPUNCTURE: CPT | Performed by: FAMILY MEDICINE

## 2022-05-05 PROCEDURE — 99214 OFFICE O/P EST MOD 30 MIN: CPT | Performed by: FAMILY MEDICINE

## 2022-05-05 PROCEDURE — 93000 ELECTROCARDIOGRAM COMPLETE: CPT | Performed by: FAMILY MEDICINE

## 2022-05-05 ASSESSMENT — PAIN SCALES - GENERAL: PAINLEVEL: NO PAIN (0)

## 2022-05-06 LAB
ANION GAP SERPL CALCULATED.3IONS-SCNC: 6 MMOL/L (ref 3–14)
BACTERIA UR CULT: ABNORMAL
BUN SERPL-MCNC: 14 MG/DL (ref 7–30)
CALCIUM SERPL-MCNC: 9.2 MG/DL (ref 8.5–10.1)
CHLORIDE BLD-SCNC: 105 MMOL/L (ref 94–109)
CO2 SERPL-SCNC: 27 MMOL/L (ref 20–32)
CREAT SERPL-MCNC: 0.63 MG/DL (ref 0.52–1.04)
GFR SERPL CREATININE-BSD FRML MDRD: >90 ML/MIN/1.73M2
GLUCOSE BLD-MCNC: 96 MG/DL (ref 70–99)
POTASSIUM BLD-SCNC: 4.3 MMOL/L (ref 3.4–5.3)
SODIUM SERPL-SCNC: 138 MMOL/L (ref 133–144)

## 2022-05-13 ENCOUNTER — LAB (OUTPATIENT)
Dept: URGENT CARE | Facility: URGENT CARE | Age: 57
End: 2022-05-13
Attending: UROLOGY
Payer: COMMERCIAL

## 2022-05-13 DIAGNOSIS — Z11.59 ENCOUNTER FOR SCREENING FOR OTHER VIRAL DISEASES: ICD-10-CM

## 2022-05-13 PROCEDURE — U0005 INFEC AGEN DETEC AMPLI PROBE: HCPCS

## 2022-05-13 PROCEDURE — U0003 INFECTIOUS AGENT DETECTION BY NUCLEIC ACID (DNA OR RNA); SEVERE ACUTE RESPIRATORY SYNDROME CORONAVIRUS 2 (SARS-COV-2) (CORONAVIRUS DISEASE [COVID-19]), AMPLIFIED PROBE TECHNIQUE, MAKING USE OF HIGH THROUGHPUT TECHNOLOGIES AS DESCRIBED BY CMS-2020-01-R: HCPCS

## 2022-05-14 LAB — SARS-COV-2 RNA RESP QL NAA+PROBE: NEGATIVE

## 2022-05-17 ENCOUNTER — ANESTHESIA (OUTPATIENT)
Dept: SURGERY | Facility: CLINIC | Age: 57
End: 2022-05-17
Payer: COMMERCIAL

## 2022-05-17 ENCOUNTER — ANESTHESIA EVENT (OUTPATIENT)
Dept: SURGERY | Facility: CLINIC | Age: 57
End: 2022-05-17
Payer: COMMERCIAL

## 2022-05-17 ENCOUNTER — HOSPITAL ENCOUNTER (OUTPATIENT)
Facility: CLINIC | Age: 57
Discharge: HOME | End: 2022-05-17
Admitting: UROLOGY
Payer: COMMERCIAL

## 2022-05-17 ENCOUNTER — APPOINTMENT (OUTPATIENT)
Dept: GENERAL RADIOLOGY | Facility: CLINIC | Age: 57
End: 2022-05-17
Payer: COMMERCIAL

## 2022-05-17 VITALS
DIASTOLIC BLOOD PRESSURE: 92 MMHG | SYSTOLIC BLOOD PRESSURE: 149 MMHG | HEIGHT: 67 IN | TEMPERATURE: 97.4 F | WEIGHT: 187 LBS | HEART RATE: 57 BPM | RESPIRATION RATE: 16 BRPM | OXYGEN SATURATION: 98 % | BODY MASS INDEX: 29.35 KG/M2

## 2022-05-17 DIAGNOSIS — N20.0 LEFT NEPHROLITHIASIS: Primary | ICD-10-CM

## 2022-05-17 PROCEDURE — 250N000013 HC RX MED GY IP 250 OP 250 PS 637: Performed by: UROLOGY

## 2022-05-17 PROCEDURE — 258N000003 HC RX IP 258 OP 636: Performed by: NURSE ANESTHETIST, CERTIFIED REGISTERED

## 2022-05-17 PROCEDURE — 370N000017 HC ANESTHESIA TECHNICAL FEE, PER MIN: Performed by: UROLOGY

## 2022-05-17 PROCEDURE — 258N000001 HC RX 258: Performed by: UROLOGY

## 2022-05-17 PROCEDURE — 250N000011 HC RX IP 250 OP 636: Performed by: NURSE ANESTHETIST, CERTIFIED REGISTERED

## 2022-05-17 PROCEDURE — 710N000012 HC RECOVERY PHASE 2, PER MINUTE: Performed by: UROLOGY

## 2022-05-17 PROCEDURE — 250N000011 HC RX IP 250 OP 636: Performed by: UROLOGY

## 2022-05-17 PROCEDURE — 82365 CALCULUS SPECTROSCOPY: CPT | Performed by: UROLOGY

## 2022-05-17 PROCEDURE — C2617 STENT, NON-COR, TEM W/O DEL: HCPCS | Performed by: UROLOGY

## 2022-05-17 PROCEDURE — 360N000076 HC SURGERY LEVEL 3, PER MIN: Performed by: UROLOGY

## 2022-05-17 PROCEDURE — C1769 GUIDE WIRE: HCPCS | Performed by: UROLOGY

## 2022-05-17 PROCEDURE — 74420 UROGRAPHY RTRGR +-KUB: CPT | Mod: 26 | Performed by: UROLOGY

## 2022-05-17 PROCEDURE — 250N000009 HC RX 250: Performed by: NURSE ANESTHETIST, CERTIFIED REGISTERED

## 2022-05-17 PROCEDURE — 250N000025 HC SEVOFLURANE, PER MIN: Performed by: UROLOGY

## 2022-05-17 PROCEDURE — 272N000001 HC OR GENERAL SUPPLY STERILE: Performed by: UROLOGY

## 2022-05-17 PROCEDURE — 52356 CYSTO/URETERO W/LITHOTRIPSY: CPT | Mod: LT | Performed by: UROLOGY

## 2022-05-17 PROCEDURE — 999N000141 HC STATISTIC PRE-PROCEDURE NURSING ASSESSMENT: Performed by: UROLOGY

## 2022-05-17 PROCEDURE — 999N000179 XR SURGERY CARM FLUORO LESS THAN 5 MIN W STILLS: Mod: TC

## 2022-05-17 PROCEDURE — 250N000009 HC RX 250: Performed by: ANESTHESIOLOGY

## 2022-05-17 PROCEDURE — 710N000009 HC RECOVERY PHASE 1, LEVEL 1, PER MIN: Performed by: UROLOGY

## 2022-05-17 PROCEDURE — 250N000009 HC RX 250: Performed by: UROLOGY

## 2022-05-17 DEVICE — URETERAL STENT
Type: IMPLANTABLE DEVICE | Site: URETER | Status: FUNCTIONAL
Brand: POLARIS™ ULTRA

## 2022-05-17 RX ORDER — CEFAZOLIN SODIUM/WATER 2 G/20 ML
2 SYRINGE (ML) INTRAVENOUS
Status: COMPLETED | OUTPATIENT
Start: 2022-05-17 | End: 2022-05-17

## 2022-05-17 RX ORDER — NITROFURANTOIN 25; 75 MG/1; MG/1
100 CAPSULE ORAL 2 TIMES DAILY
Qty: 8 CAPSULE | Refills: 0 | Status: SHIPPED | OUTPATIENT
Start: 2022-05-17 | End: 2022-05-21

## 2022-05-17 RX ORDER — ONDANSETRON 2 MG/ML
INJECTION INTRAMUSCULAR; INTRAVENOUS PRN
Status: DISCONTINUED | OUTPATIENT
Start: 2022-05-17 | End: 2022-05-17

## 2022-05-17 RX ORDER — ONDANSETRON 2 MG/ML
4 INJECTION INTRAMUSCULAR; INTRAVENOUS EVERY 30 MIN PRN
Status: DISCONTINUED | OUTPATIENT
Start: 2022-05-17 | End: 2022-05-17 | Stop reason: HOSPADM

## 2022-05-17 RX ORDER — DEXAMETHASONE SODIUM PHOSPHATE 4 MG/ML
INJECTION, SOLUTION INTRA-ARTICULAR; INTRALESIONAL; INTRAMUSCULAR; INTRAVENOUS; SOFT TISSUE PRN
Status: DISCONTINUED | OUTPATIENT
Start: 2022-05-17 | End: 2022-05-17

## 2022-05-17 RX ORDER — OXYCODONE HYDROCHLORIDE 5 MG/1
5 TABLET ORAL EVERY 4 HOURS PRN
Status: DISCONTINUED | OUTPATIENT
Start: 2022-05-17 | End: 2022-05-17 | Stop reason: HOSPADM

## 2022-05-17 RX ORDER — CEFAZOLIN SODIUM/WATER 2 G/20 ML
2 SYRINGE (ML) INTRAVENOUS SEE ADMIN INSTRUCTIONS
Status: DISCONTINUED | OUTPATIENT
Start: 2022-05-17 | End: 2022-05-17 | Stop reason: HOSPADM

## 2022-05-17 RX ORDER — OXYCODONE HYDROCHLORIDE 5 MG/1
5 TABLET ORAL EVERY 6 HOURS PRN
Qty: 6 TABLET | Refills: 0 | Status: SHIPPED | OUTPATIENT
Start: 2022-05-17 | End: 2022-05-20

## 2022-05-17 RX ORDER — HYDROMORPHONE HCL IN WATER/PF 6 MG/30 ML
0.2 PATIENT CONTROLLED ANALGESIA SYRINGE INTRAVENOUS EVERY 5 MIN PRN
Status: DISCONTINUED | OUTPATIENT
Start: 2022-05-17 | End: 2022-05-17 | Stop reason: HOSPADM

## 2022-05-17 RX ORDER — PROPOFOL 10 MG/ML
INJECTION, EMULSION INTRAVENOUS CONTINUOUS PRN
Status: DISCONTINUED | OUTPATIENT
Start: 2022-05-17 | End: 2022-05-17

## 2022-05-17 RX ORDER — FENTANYL CITRATE 0.05 MG/ML
25 INJECTION, SOLUTION INTRAMUSCULAR; INTRAVENOUS EVERY 5 MIN PRN
Status: DISCONTINUED | OUTPATIENT
Start: 2022-05-17 | End: 2022-05-17 | Stop reason: HOSPADM

## 2022-05-17 RX ORDER — OXYBUTYNIN CHLORIDE 10 MG/1
10 TABLET, EXTENDED RELEASE ORAL DAILY PRN
Qty: 7 TABLET | Refills: 0 | Status: SHIPPED | OUTPATIENT
Start: 2022-05-17 | End: 2022-09-23

## 2022-05-17 RX ORDER — MEPERIDINE HYDROCHLORIDE 25 MG/ML
12.5 INJECTION INTRAMUSCULAR; INTRAVENOUS; SUBCUTANEOUS
Status: DISCONTINUED | OUTPATIENT
Start: 2022-05-17 | End: 2022-05-17 | Stop reason: HOSPADM

## 2022-05-17 RX ORDER — TOLTERODINE TARTRATE 2 MG/1
2 TABLET, EXTENDED RELEASE ORAL ONCE
Status: COMPLETED | OUTPATIENT
Start: 2022-05-17 | End: 2022-05-17

## 2022-05-17 RX ORDER — LIDOCAINE HYDROCHLORIDE 20 MG/ML
INJECTION, SOLUTION INFILTRATION; PERINEURAL PRN
Status: DISCONTINUED | OUTPATIENT
Start: 2022-05-17 | End: 2022-05-17

## 2022-05-17 RX ORDER — IOPAMIDOL 612 MG/ML
INJECTION, SOLUTION INTRATHECAL PRN
Status: DISCONTINUED | OUTPATIENT
Start: 2022-05-17 | End: 2022-05-17 | Stop reason: HOSPADM

## 2022-05-17 RX ORDER — PROPOFOL 10 MG/ML
INJECTION, EMULSION INTRAVENOUS PRN
Status: DISCONTINUED | OUTPATIENT
Start: 2022-05-17 | End: 2022-05-17

## 2022-05-17 RX ORDER — TAMSULOSIN HYDROCHLORIDE 0.4 MG/1
0.4 CAPSULE ORAL DAILY
Qty: 7 CAPSULE | Refills: 0 | Status: SHIPPED | OUTPATIENT
Start: 2022-05-17 | End: 2022-09-23

## 2022-05-17 RX ORDER — SODIUM CHLORIDE, SODIUM LACTATE, POTASSIUM CHLORIDE, CALCIUM CHLORIDE 600; 310; 30; 20 MG/100ML; MG/100ML; MG/100ML; MG/100ML
INJECTION, SOLUTION INTRAVENOUS CONTINUOUS
Status: DISCONTINUED | OUTPATIENT
Start: 2022-05-17 | End: 2022-05-17 | Stop reason: HOSPADM

## 2022-05-17 RX ORDER — KETOROLAC TROMETHAMINE 30 MG/ML
INJECTION, SOLUTION INTRAMUSCULAR; INTRAVENOUS PRN
Status: DISCONTINUED | OUTPATIENT
Start: 2022-05-17 | End: 2022-05-17

## 2022-05-17 RX ORDER — SODIUM CHLORIDE, SODIUM LACTATE, POTASSIUM CHLORIDE, CALCIUM CHLORIDE 600; 310; 30; 20 MG/100ML; MG/100ML; MG/100ML; MG/100ML
INJECTION, SOLUTION INTRAVENOUS CONTINUOUS PRN
Status: DISCONTINUED | OUTPATIENT
Start: 2022-05-17 | End: 2022-05-17

## 2022-05-17 RX ORDER — EPHEDRINE SULFATE 50 MG/ML
INJECTION, SOLUTION INTRAMUSCULAR; INTRAVENOUS; SUBCUTANEOUS PRN
Status: DISCONTINUED | OUTPATIENT
Start: 2022-05-17 | End: 2022-05-17

## 2022-05-17 RX ORDER — ATROPA BELLADONNA AND OPIUM 16.2; 3 MG/1; MG/1
SUPPOSITORY RECTAL PRN
Status: DISCONTINUED | OUTPATIENT
Start: 2022-05-17 | End: 2022-05-17 | Stop reason: HOSPADM

## 2022-05-17 RX ORDER — FENTANYL CITRATE 0.05 MG/ML
25 INJECTION, SOLUTION INTRAMUSCULAR; INTRAVENOUS
Status: CANCELLED | OUTPATIENT
Start: 2022-05-17

## 2022-05-17 RX ORDER — MAGNESIUM HYDROXIDE 1200 MG/15ML
LIQUID ORAL PRN
Status: DISCONTINUED | OUTPATIENT
Start: 2022-05-17 | End: 2022-05-17 | Stop reason: HOSPADM

## 2022-05-17 RX ORDER — SCOLOPAMINE TRANSDERMAL SYSTEM 1 MG/1
1 PATCH, EXTENDED RELEASE TRANSDERMAL ONCE
Status: DISCONTINUED | OUTPATIENT
Start: 2022-05-17 | End: 2022-05-17 | Stop reason: HOSPADM

## 2022-05-17 RX ORDER — HYDRALAZINE HYDROCHLORIDE 20 MG/ML
2.5-5 INJECTION INTRAMUSCULAR; INTRAVENOUS
Status: DISCONTINUED | OUTPATIENT
Start: 2022-05-17 | End: 2022-05-17 | Stop reason: HOSPADM

## 2022-05-17 RX ORDER — FENTANYL CITRATE 50 UG/ML
INJECTION, SOLUTION INTRAMUSCULAR; INTRAVENOUS PRN
Status: DISCONTINUED | OUTPATIENT
Start: 2022-05-17 | End: 2022-05-17

## 2022-05-17 RX ORDER — ONDANSETRON 4 MG/1
4 TABLET, ORALLY DISINTEGRATING ORAL EVERY 30 MIN PRN
Status: DISCONTINUED | OUTPATIENT
Start: 2022-05-17 | End: 2022-05-17 | Stop reason: HOSPADM

## 2022-05-17 RX ADMIN — Medication 5 MG: at 16:29

## 2022-05-17 RX ADMIN — LIDOCAINE HYDROCHLORIDE 80 MG: 20 INJECTION, SOLUTION INFILTRATION; PERINEURAL at 15:52

## 2022-05-17 RX ADMIN — ONDANSETRON 4 MG: 2 INJECTION INTRAMUSCULAR; INTRAVENOUS at 16:01

## 2022-05-17 RX ADMIN — PROPOFOL 200 MG: 10 INJECTION, EMULSION INTRAVENOUS at 15:52

## 2022-05-17 RX ADMIN — PHENYLEPHRINE HYDROCHLORIDE 150 MCG: 10 INJECTION INTRAVENOUS at 16:01

## 2022-05-17 RX ADMIN — TOLTERODINE TARTRATE 2 MG: 2 TABLET, FILM COATED ORAL at 18:24

## 2022-05-17 RX ADMIN — KETOROLAC TROMETHAMINE 15 MG: 30 INJECTION, SOLUTION INTRAMUSCULAR at 16:44

## 2022-05-17 RX ADMIN — DEXAMETHASONE SODIUM PHOSPHATE 4 MG: 4 INJECTION, SOLUTION INTRA-ARTICULAR; INTRALESIONAL; INTRAMUSCULAR; INTRAVENOUS; SOFT TISSUE at 16:01

## 2022-05-17 RX ADMIN — SODIUM CHLORIDE, POTASSIUM CHLORIDE, SODIUM LACTATE AND CALCIUM CHLORIDE: 600; 310; 30; 20 INJECTION, SOLUTION INTRAVENOUS at 16:54

## 2022-05-17 RX ADMIN — Medication 2 G: at 15:50

## 2022-05-17 RX ADMIN — PHENYLEPHRINE HYDROCHLORIDE 150 MCG: 10 INJECTION INTRAVENOUS at 16:25

## 2022-05-17 RX ADMIN — PHENYLEPHRINE HYDROCHLORIDE 150 MCG: 10 INJECTION INTRAVENOUS at 16:20

## 2022-05-17 RX ADMIN — FENTANYL CITRATE 25 MCG: 50 INJECTION, SOLUTION INTRAMUSCULAR; INTRAVENOUS at 15:52

## 2022-05-17 RX ADMIN — SODIUM CHLORIDE, POTASSIUM CHLORIDE, SODIUM LACTATE AND CALCIUM CHLORIDE: 600; 310; 30; 20 INJECTION, SOLUTION INTRAVENOUS at 15:50

## 2022-05-17 RX ADMIN — PHENYLEPHRINE HYDROCHLORIDE 100 MCG: 10 INJECTION INTRAVENOUS at 16:11

## 2022-05-17 RX ADMIN — PHENYLEPHRINE HYDROCHLORIDE 150 MCG: 10 INJECTION INTRAVENOUS at 16:13

## 2022-05-17 RX ADMIN — PROPOFOL 50 MCG/KG/MIN: 10 INJECTION, EMULSION INTRAVENOUS at 16:00

## 2022-05-17 RX ADMIN — MIDAZOLAM 2 MG: 1 INJECTION INTRAMUSCULAR; INTRAVENOUS at 15:52

## 2022-05-17 RX ADMIN — SCOPALAMINE 1 PATCH: 1 PATCH, EXTENDED RELEASE TRANSDERMAL at 15:02

## 2022-05-17 RX ADMIN — FENTANYL CITRATE 25 MCG: 50 INJECTION, SOLUTION INTRAMUSCULAR; INTRAVENOUS at 16:49

## 2022-05-17 ASSESSMENT — ENCOUNTER SYMPTOMS
DYSRHYTHMIAS: 0
SEIZURES: 0

## 2022-05-17 ASSESSMENT — COPD QUESTIONNAIRES: COPD: 0

## 2022-05-17 ASSESSMENT — LIFESTYLE VARIABLES: TOBACCO_USE: 1

## 2022-05-17 NOTE — BRIEF OP NOTE
[facility] Brief Operative Note    Pre-operative diagnosis: Calculus of kidney with calculus of ureter [N20.2]   Post-operative diagnosis Same   Procedure: Procedure(s):  Cystoscopy, left retrograde pyelogram, left ureteroscopy with laser lithotripsy and stone basket extraction, left stent placement   Surgeon: Meliton Camarillo MD   Assistants(s): None   Estimated blood loss: Less than 10 ml    Specimens: Left ureteral and kidney stones   Findings: Ureteral stone migrated to distal ureter, was lasered and basketed  Left renal stones lasered and basketed  Some ductal plugging  5 fr x 24 cm double J stent left in place ON tether

## 2022-05-17 NOTE — ANESTHESIA PREPROCEDURE EVALUATION
"Anesthesia Pre-Procedure Evaluation    Patient: Consuelo Trejo   MRN: [id number] : 1965        Procedure : Procedure(s):  Cystoscopy, left retrograde pyelogram, left ureteroscopy with laser lithotripsy and stone basket extraction, left stent placement          No past medical history on file.   Past Surgical History:   Procedure Laterality Date     CARPAL TUNNEL RELEASE RT/LT      right      SECTION       COLONOSCOPY  22    Normal     uterine ablation        Allergies   Allergen Reactions     Advil [Ibuprofen] Other (See Comments)     \"Lump in throat\"      Social History     Tobacco Use     Smoking status: Former Smoker     Packs/day: 1.00     Years: 20.00     Pack years: 20.00     Types: Cigarettes     Quit date: 8/3/2002     Years since quittin.8     Smokeless tobacco: Never Used   Substance Use Topics     Alcohol use: No      Wt Readings from Last 1 Encounters:   22 86.6 kg (191 lb)        Anesthesia Evaluation   Pt has had prior anesthetic. Type: General.    History of anesthetic complications  - PONV.      ROS/MED HX  ENT/Pulmonary:     (+) tobacco use, Past use,  (-) asthma, COPD and sleep apnea   Neurologic:    (-) no seizures and no CVA   Cardiovascular:     (+) -----Previous cardiac testing   Echo: Date: Results:    Stress Test: Date: Results:    ECG Reviewed: Date:  Results:  NSR  Cath: Date: Results:   (-) hypertension, CAD, CHF and arrhythmias   METS/Exercise Tolerance:     Hematologic:       Musculoskeletal:       GI/Hepatic:    (-) GERD and liver disease   Renal/Genitourinary:    (-) renal disease   Endo:     (+) Obesity,  (-) Type I DM and Type II DM   Psychiatric/Substance Use:       Infectious Disease:       Malignancy:       Other:            Physical Exam    Airway        Mallampati: II   TM distance: > 3 FB   Neck ROM: full   Mouth opening: > 3 cm    Respiratory Devices and Support         Dental       (+) chipped      Cardiovascular          Rhythm and " rate: regular     Pulmonary           breath sounds clear to auscultation           OUTSIDE LABS:  CBC:   Lab Results   Component Value Date    WBC 7.4 04/25/2022    WBC 3.8 (A) 05/10/2012    HGB 15.0 04/25/2022    HGB 13.2 05/10/2012    HCT 45.5 04/25/2022    HCT 39.4 05/10/2012     04/25/2022     05/10/2012     BMP:   Lab Results   Component Value Date     05/05/2022     04/25/2022    POTASSIUM 4.3 05/05/2022    POTASSIUM 4.1 04/25/2022    CHLORIDE 105 05/05/2022    CHLORIDE 107 04/25/2022    CO2 27 05/05/2022    CO2 27 04/25/2022    BUN 14 05/05/2022    BUN 15 04/25/2022    CR 0.63 05/05/2022    CR 1.16 (H) 04/25/2022    GLC 96 05/05/2022    GLC 95 04/25/2022     COAGS: No results found for: PTT, INR, FIBR  POC:   Lab Results   Component Value Date    HCG Negative 05/07/2010     HEPATIC:   Lab Results   Component Value Date    ALBUMIN 4.0 04/25/2022    PROTTOTAL 7.9 04/25/2022    ALT 28 04/25/2022    AST 20 04/25/2022    ALKPHOS 120 04/25/2022    BILITOTAL 0.6 04/25/2022     OTHER:   Lab Results   Component Value Date    MAYNOR 9.2 05/05/2022    LIPASE 122 04/25/2022    TSH 1.65 04/18/2012    SED 30 (A) 05/10/2012       Anesthesia Plan    ASA Status:  2      Anesthesia Type: General.     - Airway: LMA   Induction: Intravenous, Propofol.   Maintenance: Balanced.        Consents    Anesthesia Plan(s) and associated risks, benefits, and realistic alternatives discussed. Questions answered and patient/representative(s) expressed understanding.    - Discussed:     - Discussed with:  Patient         Postoperative Care    Pain management: Multi-modal analgesia.   PONV prophylaxis: Ondansetron (or other 5HT-3), Dexamethasone or Solumedrol, Scopolamine patch, Background Propofol Infusion     Comments:                Jose Juan Miles MD

## 2022-05-17 NOTE — ANESTHESIA CARE TRANSFER NOTE
Patient: Consuelo Trejo    Procedure: Procedure(s):  Cystoscopy, left retrograde pyelogram, left ureteroscopy with laser lithotripsy and stone basket extraction, left stent placement       Diagnosis: Calculus of kidney with calculus of ureter [N20.2]  Diagnosis Additional Information: No value filed.    Anesthesia Type:   General     Note:    Oropharynx: oropharynx clear of all foreign objects  Level of Consciousness: awake  Oxygen Supplementation: face mask  Level of Supplemental Oxygen (L/min / FiO2): 4  Independent Airway: airway patency satisfactory and stable  Dentition: dentition unchanged  Vital Signs Stable: post-procedure vital signs reviewed and stable  Report to RN Given: handoff report given  Patient transferred to: PACU    Handoff Report: Identifed the Patient, Identified the Reponsible Provider, Reviewed the pertinent medical history, Discussed the surgical course, Reviewed Intra-OP anesthesia mangement and issues during anesthesia, Set expectations for post-procedure period and Allowed opportunity for questions and acknowledgement of understanding      Vitals:  Vitals Value Taken Time   /86 05/17/22 1715   Temp 36.3  C (97.4  F) 05/17/22 1715   Pulse 75 05/17/22 1719   Resp 25 05/17/22 1719   SpO2 95 % 05/17/22 1719   Vitals shown include unvalidated device data.    Electronically Signed By: BRADLEY Albarado CRNA  May 17, 2022  5:21 PM

## 2022-05-17 NOTE — INTERVAL H&P NOTE
I reviewed H&P.  We discussed the benefits and risks of ureteroscopy, including but not limited to, bleeding, infection, need for stent/stent related symptoms, injury to ureter, need for second procedure if unable to reach stone or residual fragments.

## 2022-05-17 NOTE — DISCHARGE INSTRUCTIONS
Same Day Surgery Discharge Instructions for  Sedation and General Anesthesia     It's not unusual to feel dizzy, light-headed or faint for up to 24 hours after surgery or while taking pain medication.  If you have these symptoms: sit for a few minutes before standing and have someone assist you when you get up to walk or use the bathroom.    You should rest and relax for the next 24 hours. We recommend you make arrangements to have an adult stay with you for at least 24 hours after your discharge.  Avoid hazardous and strenuous activity.    DO NOT DRIVE any vehicle or operate mechanical equipment for 24 hours following the end of your surgery.  Even though you may feel normal, your reactions may be affected by the medication you have received.    Do not drink alcoholic beverages for 24 hours following surgery.     Slowly progress to your regular diet as you feel able. It's not unusual to feel nauseated and/or vomit after receiving anesthesia.  If you develop these symptoms, drink clear liquids (apple juice, ginger ale, broth, 7-up, etc. ) until you feel better.  If your nausea and vomiting persists for 24 hours, please notify your surgeon.      All narcotic pain medications, along with inactivity and anesthesia, can cause constipation. Drinking plenty of liquids and increasing fiber intake will help.    For any questions of a medical nature, call your surgeon.    Do not make important decisions for 24 hours.    If you had general anesthesia, you may have a sore throat for a couple of days related to the breathing tube used during surgery.  You may use Cepacol lozenges to help with this discomfort.  If it worsens or if you develop a fever, contact your surgeon.     If you feel your pain is not well managed with the pain medications prescribed by your surgeon, please contact your surgeon's office to let them know so they can address your concerns.       CoVid 19 Information    We want to give you information regarding  Covid. Please consult your primary care provider with any questions you might have.     Patient who have symptoms (cough, fever, or shortness of breath), need to isolate for 7 days from when symptoms started OR 72 hours after fever resolves (without fever reducing medications) AND improvement of respiratory symptoms (whichever is longer).    Isolate yourself at home (in own room/own bathroom if possible)  Do Not allow any visitors  Do Not go to work or school  Do Not go to Hindu,  centers, shopping, or other public places.  Do Not shake hands.  Avoid close and intimate contact with others (hugging, kissing).  Follow CDC recommendations for household cleaning of frequently touched services.     After the initial 7 days, continue to isolate yourself from household members as much as possible. To continue decrease the risk of community spread and exposure, you and any members of your household should limit activities in public for 14 days after starting home isolation.     You can reference the following CDC link for helpful home isolation/care tips:  https://www.cdc.gov/coronavirus/2019-ncov/downloads/10Things.pdf    Protect Others:  Cover Your Mouth and Nose with a mask, disposable tissue or wash cloth to avoid spreading germs to others.  Wash your hands and face frequently with soap and water    Call Your Primary Doctor If: Breathing difficulty develops or you become worse.    For more information about COVID19 and options for caring for yourself at home, please visit the CDC website at https://www.cdc.gov/coronavirus/2019-ncov/about/steps-when-sick.html  For more options for care at [facility], please visit our website at [email or web address]       Today you received Toradol, an antiinflammatory medication similar to Ibuprofen.  You should not take other antiinflammatory medication, such as Ibuprofen, Motrin, Advil, Aleve, Naprosyn, etc until 10:45 PM.           Information for Patients Discharging with a Transderm Scopolamine Patch     Dry mouth is a common side effect.  Drowsiness is another common side effect especially when combined with pain medication.  Please avoid activities that require mental alertness such as driving a car or making important legal decisions.  Since Scopolamine can cause temporary dilation of the pupils and blurred vision if it comes in contact with the eyes; be sure to wash your hands thoroughly with soap and water immediately after handling the patch.   When you remove your patch, please stick it to a tissue or paper towel for disposal.    Remove the patch immediately and contact a physician in the unlikely event that you experience symptoms of acute glaucoma (pain and reddening of the eyes, accompanied by dilated pupils).  Remove the patch if you develop any difficulties urinating.  If you cannot urinate after removing your patch, please notify your surgeon.  Remove the patch 24 hours after surgery.                 Home-going instructions-----------------         Activity Limitation:     - No lifting restrictions  - No driving or operating heavy machinery while on narcotic pain medication.     FOLLOW THESE INSTRUCTIONS AS INDICATED BELOW:  - Observe operative area for signs of excessive bleeding.  - You may shower.  - Increase fluid intake to promote clear urine.  - Resume usual diet as tolerated    What to expect while recovering-----------  - You may experience some intermittent bleeding that makes your urine pink or cherry colored. This is normal.  - However, if you are unable to urinate, passing large amount of clots, have bernardino blood in your urine, or have a temperature >101 degrees, call the urology nurse on call, or present to your nearest emergency department.  - You are encouraged to walk daily, and have no activity restrictions.   - A URETERAL STENT has been placed that allows urine to flow unobstructed from your kidney into  your bladder.  The stent has a curl in the kidney and a curl in the bladder.  The curl in the bladder can cause some urgency and frequency of urination as well as some mild blood in the urine.  The curl in the kidney can cause some mild flank discomfort.  This may be more noticeable when you urinate.  A URETERAL STENT is meant to be left in temporarily.  It must be removed or changed no later than 3 months after it's insertion.  If it's not removed it can result in stone overgrowth on the stent that can cause pain, infection, and can be very difficult to remove.      You have a ureteral stent attached to a string coming out your urethra (the tube you urinate through).  This can be removed Tuesday May 24th morning by pulling on the string until the stent is completely removed.  The stent is 11 inches long has two loops, and once you see both the stent is out.  Please contact us if any questions/concerns.      Questions/concerns------------------------  [facility]: [phone]  Future appointments  Complete 24 hr urine study ([facility]) after stent is removed  Kidney ultrasound and xray in 6 weeks  Return visit in 8-12 weeks  Meliton Camarillo MD

## 2022-05-17 NOTE — ANESTHESIA POSTPROCEDURE EVALUATION
Patient: Consuelo Trejo    Procedure: Procedure(s):  Cystoscopy, left retrograde pyelogram, left ureteroscopy with laser lithotripsy and stone basket extraction, left stent placement       Anesthesia Type:  General    Note:     Postop Pain Control: Uneventful            Sign Out: Well controlled pain   PONV: No   Neuro/Psych: Uneventful            Sign Out: Acceptable/Baseline neuro status   Airway/Respiratory: Uneventful            Sign Out: Acceptable/Baseline resp. status   CV/Hemodynamics: Uneventful            Sign Out: Acceptable CV status; No obvious hypovolemia; No obvious fluid overload   Other NRE: NONE   DID A NON-ROUTINE EVENT OCCUR? No           Last vitals:  Vitals Value Taken Time   /96 05/17/22 1720   Temp 36.3  C (97.4  F) 05/17/22 1715   Pulse 74 05/17/22 1724   Resp 23 05/17/22 1724   SpO2 97 % 05/17/22 1724   Vitals shown include unvalidated device data.    Electronically Signed By: Joseph Montoya MD  May 17, 2022  5:25 PM

## 2022-05-17 NOTE — OR NURSING
Pt dressed, up in recliner and transported to Phase 2. Up to BR voids pink X1- [id number]-O2 sats >92% RA- Good PO intake, Denies c/o- Pt and responsible adult verbalize understanding of discharge instructions, denies questions. Up in W/C - transported to door for discharge to home.

## 2022-05-18 NOTE — OP NOTE
OPERATIVE REPORT    PREOPERATIVE DIAGNOSIS:  Left ureteral and renal stones    POSTOPERATIVE DIAGNOSIS: Same    PROCEDURES PERFORMED:   Cystoscopy  Left retrograde pyelogram  Left ureteroscopy laser lithotripsy and stone basket extraction  Left ureteral stent placement    STAFF SURGEON: Meliton Camarillo MD  RESIDENT(S): None  ANESTHESIA: General  ESTIMATED BLOOD LOSS: 5 ml  COMPLICATIONS: None.   SPECIMEN: Left ureteral and renal stone for analysis    SIGNIFICANT FINDINGS:   No mucosal bladder abnormalities, some prolapse  Retrograde pyelogram with ureteral and renal stones visualized on  and mild hydronephrosis  The ureteral stone was in the distal ureter lasered and basket extracted  The renal stones were lasered and basket extracted  5 Congolese x 24 cm double-J stent left on tether    BRIEF OPERATIVE INDICATIONS: Consuelo Trejo is a(n) 57 year old female who presented with left renal colic with a proximal ureteral stone and nonobstructing renal stone.  After a discussion of all risks, benefits, and alternatives, the patient elected to proceed with ureteroscopic management.    DESCRIPTION OF PROCEDURE:  After informed consent was obtained, the patient was transported to the operating room & placed supine on the table. After adequate anesthesia was induced, the patient was placed in lithotomy and prepped and draped in the usual sterile fashion. A timeout was taken to confirm correct patient, procedure and laterality. Pre-operative IV antibiotics were administered.     A 22 Congolese cystoscope was inserted through the urethra and the bladder without any visualized urethral or bladder mucosal abnormalities.  There was some posterior wall prolapse.  Orthotopic ureteral orifices were present.  The 0.035 sensor tip guidewire required a dual-lumen catheter to assist with wire placement.  Because of this and  imaging suggesting a possible distal migration of her ureteral stone, we used a semirigid ureteroscope  and found the stone a few centimeters from the ureteral orifice.  This was lasered with settings of 0.6 J and 6 Hz and stone fragments were basketed and left in the bladder.  The ureter was clear proximally past the iliac vessels.    Dual-lumen was reinserted over the wire and retrograde pyelogram demonstrated mild hydronephrosis and radiopaque renal stone on the .  A second wire was placed and a 11 x 13 Romanian by 36 cm ureteral access sheath was placed to the proximal ureter.  Flexible ureteroscopy was performed and there was a midpole stone that was removed by basket extraction.  Of note there was some dilated ducts with ductal plugging present.  In the upper pole calyx, there was a small calyx with the 7 mm stone present that was broken with laser lithotripsy with fragments removed by basket extraction.  After clearing all the calyces from stone fragments, pullback ureteroscopy was performed.  There is no mucosal abrasion or injury.    A 5 Romanian by 24 cm double-J ureteral stent was placed under fluoroscopic guidance with good proximal curl.  Distal curl placement was confirmed with cystoscopy.  Of note the tether was modified to a single string and left in situ for patient self removal of the stent in a week.    They were awakened from anesthesia and transferred to the PACU.       POSTOP PLAN:  Stent removal in a week by the patient  Patient will be sent a Litholink  KUB/renal ultrasound in 6 weeks  Follow-up in 8 to 12 weeks

## 2022-05-20 LAB
APPEARANCE STONE: NORMAL
COMPN STONE: NORMAL
SPECIMEN WT: 105 MG

## 2022-05-23 ENCOUNTER — MEDICAL CORRESPONDENCE (OUTPATIENT)
Dept: HEALTH INFORMATION MANAGEMENT | Facility: CLINIC | Age: 57
End: 2022-05-23
Payer: COMMERCIAL

## 2022-06-04 ENCOUNTER — HEALTH MAINTENANCE LETTER (OUTPATIENT)
Age: 57
End: 2022-06-04

## 2022-06-24 ENCOUNTER — TRANSFERRED RECORDS (OUTPATIENT)
Dept: HEALTH INFORMATION MANAGEMENT | Facility: CLINIC | Age: 57
End: 2022-06-24

## 2022-09-22 ENCOUNTER — PRE VISIT (OUTPATIENT)
Dept: UROLOGY | Facility: CLINIC | Age: 57
End: 2022-09-22

## 2022-09-22 NOTE — TELEPHONE ENCOUNTER
Reason for Visit: Consult on Calculus of kidney with calculus of ureter    Diagnosis: Calculus of kidney with calculus of ureter    Rooming Requirements: David Ferguson  09/22/22  12:54 PM

## 2022-09-23 ENCOUNTER — VIRTUAL VISIT (OUTPATIENT)
Dept: UROLOGY | Facility: CLINIC | Age: 57
End: 2022-09-23
Payer: COMMERCIAL

## 2022-09-23 DIAGNOSIS — N20.0 CALCIUM OXALATE MONOHYDRATE KIDNEY STONES: Primary | ICD-10-CM

## 2022-09-23 DIAGNOSIS — R82.994 HYPERCALCIURIA: ICD-10-CM

## 2022-09-23 PROCEDURE — 99213 OFFICE O/P EST LOW 20 MIN: CPT | Mod: GT | Performed by: UROLOGY

## 2022-09-23 NOTE — PROGRESS NOTES
Consuelo is a 57 year old who is being evaluated via a billable video visit.      How would you like to obtain your AVS? MyChart  If the video visit is dropped, the invitation should be resent by: Send to e-mail at: [email or web address]  Will anyone else be joining your video visit? Yes, Iris      Name: Consuelo Trejo   MRN: [id number]  YOB: 1965    Assessment and Plan:  57 year old female with calcium oxalate nephrolithiasis status post ureteroscopy.  She is doing well however has not had postop follow-up imaging.  Urine testing does show low urine volume, hypercalciuria, low urine pH..    1. Calcium oxalate monohydrate kidney stones  2. Hypercalciuria  3.  Low urine volume    Discussed possible dietary and medical options to assist with stone prevention.  Based on stone analysis and 24 hr urine test, I counseled regarding:    Adequate fluid intake with goal of 64 to 80 fluid ounces a day.  Discussed strategies to increase this including going slowly over the period of weeks to get this goal after creating a log to see what baseline fluid intake is.     Consuming taking and recommended dietary calcium 1000 to 1200 mg a day.  Discussed how low calcium can increase risk of stone disease by bone demineralization.     Adequate fruit and vegetable intake as this is important for further stone prevention.    Although she has hypercalciuria, will address the low urine volume first see how this affects the level of hypercalciuria.  If she has still significant hypercalciuria, will start her on a thiazide.  Discussed regarding adequate calcium intake and how that can lead to hypercalciuria as well.     Plan:  -Increase fluid intake and work with dietitian  -DELIA and renal ultrasound now  -Litholink and follow-up visit in 3 months    Meliton Camarillo MD  September 23, 2022         Chief Complaint: Stone follow-up    History of Present Illness:  Consuelo Trejo is a 57 year old female seen after left  "ureteroscopy on 5/17/2022.    Postoperatively:  Stent was removed on a string without issue. Since surgery, they did not have an unplanned clinic visit, did not visit the emergency department,  did not get readmitted, did not require additional unplanned procedure.    Currently, they are experiencing no flank pain, no nausea, no vomiting, no hematuria, or no dysuria.  They have experienced recent stone passage.      Has not had postoperative imaging.     Metabolic:  Medical risk factors: family history  Stone analysis: COM  Blood work:  Sodium   Date Value Ref Range Status   05/05/2022 138 133 - 144 mmol/L Final   04/18/2012 138 135 - 146 mmol/L Final     Potassium   Date Value Ref Range Status   05/05/2022 4.3 3.4 - 5.3 mmol/L Final   04/18/2012 4.3 3.5 - 5.3 mmol/L Final     Chloride   Date Value Ref Range Status   05/05/2022 105 94 - 109 mmol/L Final   04/18/2012 103 98 - 110 mmol/L Final     Creatinine   Date Value Ref Range Status   05/05/2022 0.63 0.52 - 1.04 mg/dL Final   04/18/2012 0.90 0.50 - 1.10 mg/dL Final     Calcium   Date Value Ref Range Status   05/05/2022 9.2 8.5 - 10.1 mg/dL Final   04/18/2012 9.5 8.6 - 10.2 mg/dL Final        24 hr urine study from 6/24/2022:  + adequate collection  + Low urine volume (1.07 L)  + Hypercalciuria (Ca/Cr Ratio 276)  -- Hyperoxaluria  -- Hypocitraturia  -- Hypernatriuria  + Aciduria  -- Alkaluria  -- Hypomagnesuria         Allergies:  Allergies   Allergen Reactions     Advil [Ibuprofen] Other (See Comments)     \"Lump in throat\"            Medications:  Current Outpatient Medications   Medication Sig     ondansetron (ZOFRAN) 4 MG tablet Take 1 tablet (4 mg) by mouth every 8 hours as needed for nausea     oxybutynin ER (DITROPAN XL) 10 MG 24 hr tablet Take 1 tablet (10 mg) by mouth daily as needed for bladder spasms (Patient not taking: Reported on 9/23/2022)     tamsulosin (FLOMAX) 0.4 MG capsule Take 1 capsule (0.4 mg) by mouth daily (Patient not taking: Reported on " "9/23/2022)     tamsulosin (FLOMAX) 0.4 MG capsule Take 1 capsule (0.4 mg) by mouth daily (Patient not taking: Reported on 9/23/2022)     No current facility-administered medications for this visit.       Review of Systems:   ROS: 10 point ROS neg other than the symptoms noted above in the HPI.    Physical Exam:  B/P: Data Unavailable, T: Data Unavailable, P: Data Unavailable, R: Data Unavailable  Estimated body mass index is 29.29 kg/m  as calculated from the following:    Height as of 5/17/22: 1.702 m (5' 7\").    Weight as of 5/17/22: 84.8 kg (187 lb).  General: age-appropriate appearing female in NAD.      Video-Visit Details    Video Start Time: 9:05 AM    Type of service:  Video Visit    Video End Time:9:20 AM    Originating Location (pt. Location): Home    Distant Location (provider location):  [facility] [place]     Platform used for Video Visit: Danny      " fax/Karly Clark LCSW/ (specify name)

## 2022-09-23 NOTE — LETTER
Date:October 10, 2022      Patient was self referred, no letter generated. Do not send.        [facility] Health Information

## 2022-09-23 NOTE — LETTER
9/23/2022       RE: Consuelo Trejo  [address] [address]     Dear Colleague,    Thank you for referring your patient, Consuelo Trejo, to the [facility] [place] at [facility]. Please see a copy of my visit note below.    Consuelo is a 57 year old who is being evaluated via a billable video visit.      How would you like to obtain your AVS? MyChart  If the video visit is dropped, the invitation should be resent by: Send to e-mail at: [email or web address]  Will anyone else be joining your video visit? Yes, Iris      Name: Consuelo Trejo   MRN: [id number]  YOB: 1965    Assessment and Plan:  57 year old female with calcium oxalate nephrolithiasis status post ureteroscopy.  She is doing well however has not had postop follow-up imaging.  Urine testing does show low urine volume, hypercalciuria, low urine pH..    1. Calcium oxalate monohydrate kidney stones  2. Hypercalciuria  3.  Low urine volume    Discussed possible dietary and medical options to assist with stone prevention.  Based on stone analysis and 24 hr urine test, I counseled regarding:    Adequate fluid intake with goal of 64 to 80 fluid ounces a day.  Discussed strategies to increase this including going slowly over the period of weeks to get this goal after creating a log to see what baseline fluid intake is.     Consuming taking and recommended dietary calcium 1000 to 1200 mg a day.  Discussed how low calcium can increase risk of stone disease by bone demineralization.     Adequate fruit and vegetable intake as this is important for further stone prevention.    Although she has hypercalciuria, will address the low urine volume first see how this affects the level of hypercalciuria.  If she has still significant hypercalciuria, will start her on a thiazide.  Discussed regarding adequate calcium intake and how that can lead to  "hypercalciuria as well.     Plan:  -Increase fluid intake and work with dietitian  -KUB and renal ultrasound now  -Litholink and follow-up visit in 3 months    Meliton Camarillo MD  September 23, 2022         Chief Complaint: Stone follow-up    History of Present Illness:  Consuelo Trejo is a 57 year old female seen after left ureteroscopy on 5/17/2022.    Postoperatively:  Stent was removed on a string without issue. Since surgery, they did not have an unplanned clinic visit, did not visit the emergency department,  did not get readmitted, did not require additional unplanned procedure.    Currently, they are experiencing no flank pain, no nausea, no vomiting, no hematuria, or no dysuria.  They have experienced recent stone passage.      Has not had postoperative imaging.     Metabolic:  Medical risk factors: family history  Stone analysis: COM  Blood work:  Sodium   Date Value Ref Range Status   05/05/2022 138 133 - 144 mmol/L Final   04/18/2012 138 135 - 146 mmol/L Final     Potassium   Date Value Ref Range Status   05/05/2022 4.3 3.4 - 5.3 mmol/L Final   04/18/2012 4.3 3.5 - 5.3 mmol/L Final     Chloride   Date Value Ref Range Status   05/05/2022 105 94 - 109 mmol/L Final   04/18/2012 103 98 - 110 mmol/L Final     Creatinine   Date Value Ref Range Status   05/05/2022 0.63 0.52 - 1.04 mg/dL Final   04/18/2012 0.90 0.50 - 1.10 mg/dL Final     Calcium   Date Value Ref Range Status   05/05/2022 9.2 8.5 - 10.1 mg/dL Final   04/18/2012 9.5 8.6 - 10.2 mg/dL Final        24 hr urine study from 6/24/2022:  + adequate collection  + Low urine volume (1.07 L)  + Hypercalciuria (Ca/Cr Ratio 276)  -- Hyperoxaluria  -- Hypocitraturia  -- Hypernatriuria  + Aciduria  -- Alkaluria  -- Hypomagnesuria         Allergies:  Allergies   Allergen Reactions     Advil [Ibuprofen] Other (See Comments)     \"Lump in throat\"            Medications:  Current Outpatient Medications   Medication Sig     ondansetron (ZOFRAN) 4 MG tablet Take 1 " "tablet (4 mg) by mouth every 8 hours as needed for nausea     oxybutynin ER (DITROPAN XL) 10 MG 24 hr tablet Take 1 tablet (10 mg) by mouth daily as needed for bladder spasms (Patient not taking: Reported on 9/23/2022)     tamsulosin (FLOMAX) 0.4 MG capsule Take 1 capsule (0.4 mg) by mouth daily (Patient not taking: Reported on 9/23/2022)     tamsulosin (FLOMAX) 0.4 MG capsule Take 1 capsule (0.4 mg) by mouth daily (Patient not taking: Reported on 9/23/2022)     No current facility-administered medications for this visit.       Review of Systems:   ROS: 10 point ROS neg other than the symptoms noted above in the HPI.    Physical Exam:  B/P: Data Unavailable, T: Data Unavailable, P: Data Unavailable, R: Data Unavailable  Estimated body mass index is 29.29 kg/m  as calculated from the following:    Height as of 5/17/22: 1.702 m (5' 7\").    Weight as of 5/17/22: 84.8 kg (187 lb).  General: age-appropriate appearing female in NAD.      Video-Visit Details    Video Start Time: 9:05 AM    Type of service:  Video Visit    Video End Time:9:20 AM    Originating Location (pt. Location): Home    Distant Location (provider location):  [facility] [place]     Platform used for Video Visit: Danny          Again, thank you for allowing me to participate in the care of your patient.      Sincerely,    Meliton Camarillo MD      "

## 2022-10-09 PROBLEM — N20.0 CALCIUM OXALATE MONOHYDRATE KIDNEY STONES: Status: ACTIVE | Noted: 2022-10-09

## 2022-10-09 PROBLEM — R82.994 HYPERCALCIURIA: Status: ACTIVE | Noted: 2022-10-09

## 2022-10-10 ENCOUNTER — MEDICAL CORRESPONDENCE (OUTPATIENT)
Dept: HEALTH INFORMATION MANAGEMENT | Facility: CLINIC | Age: 57
End: 2022-10-10

## 2022-10-10 ENCOUNTER — HEALTH MAINTENANCE LETTER (OUTPATIENT)
Age: 57
End: 2022-10-10

## 2022-10-10 NOTE — PATIENT INSTRUCTIONS
-Increase fluid intake and work with dietitian   -KUB and renal ultrasound now   -Litholink and follow-up visit in 3 months

## 2022-10-13 ENCOUNTER — ANCILLARY PROCEDURE (OUTPATIENT)
Dept: ULTRASOUND IMAGING | Facility: CLINIC | Age: 57
End: 2022-10-13
Attending: UROLOGY
Payer: COMMERCIAL

## 2022-10-13 ENCOUNTER — ANCILLARY PROCEDURE (OUTPATIENT)
Dept: GENERAL RADIOLOGY | Facility: CLINIC | Age: 57
End: 2022-10-13
Attending: UROLOGY
Payer: COMMERCIAL

## 2022-10-13 DIAGNOSIS — N20.0 CALCIUM OXALATE MONOHYDRATE KIDNEY STONES: ICD-10-CM

## 2022-10-13 PROCEDURE — 74018 RADEX ABDOMEN 1 VIEW: CPT

## 2022-10-13 PROCEDURE — 76770 US EXAM ABDO BACK WALL COMP: CPT

## 2022-11-27 ENCOUNTER — HEALTH MAINTENANCE LETTER (OUTPATIENT)
Age: 57
End: 2022-11-27

## 2023-01-27 ENCOUNTER — VIRTUAL VISIT (OUTPATIENT)
Dept: UROLOGY | Facility: CLINIC | Age: 58
End: 2023-01-27
Payer: COMMERCIAL

## 2023-01-27 DIAGNOSIS — D35.02 ADENOMA OF LEFT ADRENAL GLAND: Primary | ICD-10-CM

## 2023-01-27 PROCEDURE — 99214 OFFICE O/P EST MOD 30 MIN: CPT | Mod: GT | Performed by: UROLOGY

## 2023-01-27 RX ORDER — DEXAMETHASONE 1 MG
1 TABLET ORAL ONCE
Qty: 1 TABLET | Refills: 0 | Status: SHIPPED | OUTPATIENT
Start: 2023-01-27 | End: 2023-01-27

## 2023-01-27 NOTE — LETTER
1/27/2023       RE: Consuelo Trejo  [address] [address]     Dear Colleague,    Thank you for referring your patient, Consuelo Trejo, to the [facility]OLOG[facility] [place] at [facility]. Please see a copy of my visit note below.    Consuelo is a 57 year old who is being evaluated via a billable video visit.      How would you like to obtain your AVS? [employer]  If the video visit is dropped, the invitation should be resent by: Send to e-mail at: [email or web address]  Will anyone else be joining your video visit? Yes: Iris. How would they like to receive their invitation? Send to e-mail at: [email or web address]      Name: Consuelo Trejo   MRN: [id number]  YOB: 1965    Assessment and Plan:  57 year old female with a history of calcium oxalate monohydrate nephrolithiasis and a small left adrenal adenoma.    1.  Calcium oxalate monohydrate nephrolithiasis  2.  Left adrenal adenoma    Patient has made substantive dietary changes in the form of increased fluid intake.  We will repeat a Litholink to see if this is helped reduce her supersaturation's and assess the amount of hypercalciuria.    Will complete her adenoma work-up with a dexamethasone suppression test (1 mg dexamethasone tablet prescribed and instructed take 11 PM the night before a morning blood draw) and we will also check plasma free metanephrines.  We will omit aldosterone reading as patient does not have hypertension.  If she were to develop hypertension future, this would be an indicated test.    She will complete her Litholink and we will follow-up these results via [employer].  Unless severe metabolic abnormality, repeat imaging in the fall with return visit.    Meliton Camarillo MD  January 27, 2023    Spent 20 minutes total time of encounter         Chief Complaint: Stone and adrenal adenoma follow-up    History of Present Illness:  Consuelo ESCAMILLA  "Neil is a 57 year old female seen after left ureteroscopy on 8/17/2022 for calcium oxalate monohydrate stone.  Her metabolic evaluation was notable for low urine volume and hypercalciuria with some aciduria as well, and we recommended dietary changes.    Has been working on increasing fluid intake, although sometimes has to make up at night.  She is taking 2 Citracal tablets per day.      No history of hypertension or virilization symptoms.    Currently, they are experiencing no flank pain, no nausea, no vomiting, no hematuria, or no dysuria.      Imaging (10/13/2022, personally reviewed), demonstrated:   KUB no stones  Renal US no hydronephrosis.  Two left stones visualized that were not seen on KUB     Metabolic:  Medical risk factors: personal and family history  Blood work:  Sodium   Date Value Ref Range Status   05/05/2022 138 133 - 144 mmol/L Final   04/18/2012 138 135 - 146 mmol/L Final     Potassium   Date Value Ref Range Status   05/05/2022 4.3 3.4 - 5.3 mmol/L Final   04/18/2012 4.3 3.5 - 5.3 mmol/L Final     Chloride   Date Value Ref Range Status   05/05/2022 105 94 - 109 mmol/L Final   04/18/2012 103 98 - 110 mmol/L Final     Creatinine   Date Value Ref Range Status   05/05/2022 0.63 0.52 - 1.04 mg/dL Final   04/18/2012 0.90 0.50 - 1.10 mg/dL Final     Calcium   Date Value Ref Range Status   05/05/2022 9.2 8.5 - 10.1 mg/dL Final   04/18/2012 9.5 8.6 - 10.2 mg/dL Final             Allergies:  Allergies   Allergen Reactions     Advil [Ibuprofen] Other (See Comments)     \"Lump in throat\"            Medications:  No current outpatient medications on file.     No current facility-administered medications for this visit.       Review of Systems:   ROS: 10 point ROS neg other than the symptoms noted above in the HPI.    Physical Exam:  B/P: Data Unavailable, T: Data Unavailable, P: Data Unavailable, R: Data Unavailable  Estimated body mass index is 29.29 kg/m  as calculated from the following:    Height as " "of 5/17/22: 1.702 m (5' 7\").    Weight as of 5/17/22: 84.8 kg (187 lb).  General: age-appropriate appearing female in NAD.    Video-Visit Details    Type of service:  Video Visit     Originating Location (pt. Location): Home    Distant Location (provider location):  On-site  Platform used for Video Visit: Danny      "

## 2023-01-27 NOTE — PATIENT INSTRUCTIONS
Please complete your lab tests the morning after taking the 1 mg dexamethasone tablet.    Message us when you have sent in the repeat Secul[employer].

## 2023-01-27 NOTE — PROGRESS NOTES
Consuelo is a 57 year old who is being evaluated via a billable video visit.      How would you like to obtain your AVS? [employer]  If the video visit is dropped, the invitation should be resent by: Send to e-mail at: [email or web address]  Will anyone else be joining your video visit? Yes: Iris. How would they like to receive their invitation? Send to e-mail at: [email or web address]      Name: Consuelo Trejo   MRN: [id number]  YOB: 1965    Assessment and Plan:  57 year old female with a history of calcium oxalate monohydrate nephrolithiasis and a small left adrenal adenoma.    1.  Calcium oxalate monohydrate nephrolithiasis  2.  Left adrenal adenoma    Patient has made substantive dietary changes in the form of increased fluid intake.  We will repeat a Litholink to see if this is helped reduce her supersaturation's and assess the amount of hypercalciuria.    Will complete her adenoma work-up with a dexamethasone suppression test (1 mg dexamethasone tablet prescribed and instructed take 11 PM the night before a morning blood draw) and we will also check plasma free metanephrines.  We will omit aldosterone reading as patient does not have hypertension.  If she were to develop hypertension future, this would be an indicated test.    She will complete her Litholink and we will follow-up these results via [employer].  Unless severe metabolic abnormality, repeat imaging in the fall with return visit.    Meliton Camarillo MD  January 27, 2023    Spent 20 minutes total time of encounter         Chief Complaint: Stone and adrenal adenoma follow-up    History of Present Illness:  Consuelo Trejo is a 57 year old female seen after left ureteroscopy on 8/17/2022 for calcium oxalate monohydrate stone.  Her metabolic evaluation was notable for low urine volume and hypercalciuria with some aciduria as well, and we recommended dietary changes.    Has been working on increasing fluid intake, although sometimes  "has to make up at night.  She is taking 2 Citracal tablets per day.      No history of hypertension or virilization symptoms.    Currently, they are experiencing no flank pain, no nausea, no vomiting, no hematuria, or no dysuria.      Imaging (10/13/2022, personally reviewed), demonstrated:   KUB no stones  Renal US no hydronephrosis.  Two left stones visualized that were not seen on KUB     Metabolic:  Medical risk factors: personal and family history  Blood work:  Sodium   Date Value Ref Range Status   05/05/2022 138 133 - 144 mmol/L Final   04/18/2012 138 135 - 146 mmol/L Final     Potassium   Date Value Ref Range Status   05/05/2022 4.3 3.4 - 5.3 mmol/L Final   04/18/2012 4.3 3.5 - 5.3 mmol/L Final     Chloride   Date Value Ref Range Status   05/05/2022 105 94 - 109 mmol/L Final   04/18/2012 103 98 - 110 mmol/L Final     Creatinine   Date Value Ref Range Status   05/05/2022 0.63 0.52 - 1.04 mg/dL Final   04/18/2012 0.90 0.50 - 1.10 mg/dL Final     Calcium   Date Value Ref Range Status   05/05/2022 9.2 8.5 - 10.1 mg/dL Final   04/18/2012 9.5 8.6 - 10.2 mg/dL Final             Allergies:  Allergies   Allergen Reactions     Advil [Ibuprofen] Other (See Comments)     \"Lump in throat\"            Medications:  No current outpatient medications on file.     No current facility-administered medications for this visit.       Review of Systems:   ROS: 10 point ROS neg other than the symptoms noted above in the HPI.    Physical Exam:  B/P: Data Unavailable, T: Data Unavailable, P: Data Unavailable, R: Data Unavailable  Estimated body mass index is 29.29 kg/m  as calculated from the following:    Height as of 5/17/22: 1.702 m (5' 7\").    Weight as of 5/17/22: 84.8 kg (187 lb).  General: age-appropriate appearing female in NAD.    Video-Visit Details    Type of service:  Video Visit     Originating Location (pt. Location): Home    Distant Location (provider location):  On-site  Platform used for Video Visit: Danny"

## 2023-02-10 ENCOUNTER — TRANSFERRED RECORDS (OUTPATIENT)
Dept: HEALTH INFORMATION MANAGEMENT | Facility: CLINIC | Age: 58
End: 2023-02-10
Payer: COMMERCIAL

## 2023-02-24 ENCOUNTER — LAB (OUTPATIENT)
Dept: LAB | Facility: CLINIC | Age: 58
End: 2023-02-24
Payer: COMMERCIAL

## 2023-02-24 ENCOUNTER — TELEPHONE (OUTPATIENT)
Dept: INTERNAL MEDICINE | Facility: CLINIC | Age: 58
End: 2023-02-24

## 2023-02-24 DIAGNOSIS — D35.02 ADENOMA OF LEFT ADRENAL GLAND: ICD-10-CM

## 2023-02-24 LAB — CORTIS SERPL-MCNC: 1.1 UG/DL

## 2023-02-24 PROCEDURE — 36415 COLL VENOUS BLD VENIPUNCTURE: CPT

## 2023-02-24 PROCEDURE — 82533 TOTAL CORTISOL: CPT

## 2023-02-24 PROCEDURE — 83835 ASSAY OF METANEPHRINES: CPT

## 2023-02-24 NOTE — TELEPHONE ENCOUNTER
"CC: Patient calling with questions about upcoming lab appointment:    2/24/2023 10:30 AM [facility]     Per appointment notes - labs ordered by Dr. Camarillo.    Patient has questions regarding the following instructions (see office visit notes on 1/27/23)    \"Will complete her adenoma work-up with a dexamethasone suppression test (1 mg dexamethasone tablet prescribed and instructed take 11 PM the night before a morning blood draw)\"    Writer advised patient to call urology clinic directly to clarify questions - patient expressed verbal understanding and will call urology clinic - also routing to urology team to please reach out to patient to address questions    Thank you!    Brien Arevalo RN  [facility]  "

## 2023-02-27 ENCOUNTER — TELEPHONE (OUTPATIENT)
Dept: FAMILY MEDICINE | Facility: CLINIC | Age: 58
End: 2023-02-27

## 2023-02-27 NOTE — TELEPHONE ENCOUNTER
Needs of attention regarding:  -Colon Cancer Screening  -Cervical Cancer Screening  -Wellness (Physical) Visit     Health Maintenance Topics with due status: Overdue       Topic Date Due    ADVANCE CARE PLANNING Never done    COLORECTAL CANCER SCREENING Never done    HIV SCREENING Never done    HEPATITIS C SCREENING Never done    PAP Never done    LIPID 04/18/2017    COVID-19 Vaccine 12/31/2021    YEARLY PREVENTIVE VISIT 10/14/2022       Communication:  Patient called - message left

## 2023-02-28 LAB
ANNOTATION COMMENT IMP: NORMAL
METANEPHS SERPL-SCNC: 0.1 NMOL/L
NORMETANEPHRINE SERPL-SCNC: 0.43 NMOL/L

## 2023-04-17 NOTE — TELEPHONE ENCOUNTER
Needs of attention regarding:  -Colon Cancer Screening  -Wellness (Physical) Visit     Health Maintenance Topics with due status: Overdue       Topic Date Due    ADVANCE CARE PLANNING Never done    COLORECTAL CANCER SCREENING Never done    HIV SCREENING Never done    HEPATITIS C SCREENING Never done    PAP Never done    LIPID 04/18/2017    COVID-19 Vaccine 12/31/2021    YEARLY PREVENTIVE VISIT 10/14/2022     Health Maintenance Topics with due status: Due Soon       Topic Date Due    ANNUAL REVIEW OF HM ORDERS 05/05/2023       Communication:  See Letter

## 2024-01-07 ENCOUNTER — HEALTH MAINTENANCE LETTER (OUTPATIENT)
Age: 59
End: 2024-01-07

## 2025-01-25 ENCOUNTER — HEALTH MAINTENANCE LETTER (OUTPATIENT)
Age: 60
End: 2025-01-25

## (undated) DEVICE — GUIDEWIRE SENSOR DUAL FLEX STR 0.035"X150CM M0066703080

## (undated) DEVICE — SOL WATER IRRIG 1000ML BOTTLE 2F7114

## (undated) DEVICE — BAG DRAIN URO FOR SIEMENS 8MM ADAPTER NS CC164NS-A

## (undated) DEVICE — ENDO ADAPTER CHECK-FLO DISP 27550-CKU

## (undated) DEVICE — PAD CHUX UNDERPAD 23X24" 7136

## (undated) DEVICE — RAD RX ISOVUE 300 (50ML) 61% IOPAMIDOL CHARGE PER ML

## (undated) DEVICE — BASKET STONE RETRIEVAL NTNL ZERO TIP 1.9FRX90CM M0063901050

## (undated) DEVICE — PACK CYSTOSCOPY SBA15CYFSI

## (undated) DEVICE — CATH URETERAL OPEN END 6FR AXXCESS

## (undated) DEVICE — GLOVE PROTEXIS BLUE W/NEU-THERA 7.0  2D73EB70

## (undated) DEVICE — SOL NACL 0.9% IRRIG 3000ML BAG 2B7477

## (undated) DEVICE — Device

## (undated) DEVICE — GLOVE PROTEXIS MICRO 7.0  2D73PM70

## (undated) RX ORDER — DEXAMETHASONE SODIUM PHOSPHATE 4 MG/ML
INJECTION, SOLUTION INTRA-ARTICULAR; INTRALESIONAL; INTRAMUSCULAR; INTRAVENOUS; SOFT TISSUE
Status: DISPENSED
Start: 2022-05-17

## (undated) RX ORDER — LIDOCAINE HYDROCHLORIDE 20 MG/ML
INJECTION, SOLUTION EPIDURAL; INFILTRATION; INTRACAUDAL; PERINEURAL
Status: DISPENSED
Start: 2022-05-17

## (undated) RX ORDER — FENTANYL CITRATE 50 UG/ML
INJECTION, SOLUTION INTRAMUSCULAR; INTRAVENOUS
Status: DISPENSED
Start: 2022-05-17

## (undated) RX ORDER — ATROPA BELLADONNA AND OPIUM 16.2; 3 MG/1; MG/1
SUPPOSITORY RECTAL
Status: DISPENSED
Start: 2022-05-17

## (undated) RX ORDER — SCOLOPAMINE TRANSDERMAL SYSTEM 1 MG/1
PATCH, EXTENDED RELEASE TRANSDERMAL
Status: DISPENSED
Start: 2022-05-17

## (undated) RX ORDER — PROPOFOL 10 MG/ML
INJECTION, EMULSION INTRAVENOUS
Status: DISPENSED
Start: 2022-05-17

## (undated) RX ORDER — NEOSTIGMINE METHYLSULFATE 1 MG/ML
VIAL (ML) INJECTION
Status: DISPENSED
Start: 2022-05-17